# Patient Record
Sex: MALE | Race: WHITE | NOT HISPANIC OR LATINO | Employment: OTHER | URBAN - METROPOLITAN AREA
[De-identification: names, ages, dates, MRNs, and addresses within clinical notes are randomized per-mention and may not be internally consistent; named-entity substitution may affect disease eponyms.]

---

## 2017-03-14 ENCOUNTER — TELEPHONE (OUTPATIENT)
Dept: CARDIOLOGY | Facility: CLINIC | Age: 72
End: 2017-03-14

## 2017-03-14 DIAGNOSIS — I15.9 SECONDARY HYPERTENSION: ICD-10-CM

## 2017-03-14 DIAGNOSIS — I25.10 CORONARY ARTERY DISEASE, ANGINA PRESENCE UNSPECIFIED, UNSPECIFIED VESSEL OR LESION TYPE, UNSPECIFIED WHETHER NATIVE OR TRANSPLANTED HEART: ICD-10-CM

## 2017-03-14 DIAGNOSIS — Z95.1 S/P CABG X 2: Primary | ICD-10-CM

## 2017-03-14 DIAGNOSIS — I73.9 PVD (PERIPHERAL VASCULAR DISEASE): ICD-10-CM

## 2017-03-14 NOTE — TELEPHONE ENCOUNTER
----- Message from Rasheeda Hernandez sent at 3/14/2017 11:49 AM CDT -----  Contact: Stephanie  Patient's daughter called to schedule annual visit; scheduled for 5/19/17 and needs lab orders scheduled. Please call when scheduled [phone]. Thanks!

## 2017-05-12 ENCOUNTER — LAB VISIT (OUTPATIENT)
Dept: LAB | Facility: HOSPITAL | Age: 72
End: 2017-05-12
Attending: INTERNAL MEDICINE
Payer: MEDICARE

## 2017-05-12 DIAGNOSIS — I25.10 CORONARY ARTERY DISEASE, ANGINA PRESENCE UNSPECIFIED, UNSPECIFIED VESSEL OR LESION TYPE, UNSPECIFIED WHETHER NATIVE OR TRANSPLANTED HEART: ICD-10-CM

## 2017-05-12 DIAGNOSIS — I15.9 SECONDARY HYPERTENSION: ICD-10-CM

## 2017-05-12 DIAGNOSIS — Z95.1 S/P CABG X 2: ICD-10-CM

## 2017-05-12 DIAGNOSIS — I73.9 PVD (PERIPHERAL VASCULAR DISEASE): ICD-10-CM

## 2017-05-12 LAB
ALBUMIN SERPL BCP-MCNC: 3.7 G/DL
ALP SERPL-CCNC: 45 U/L
ALT SERPL W/O P-5'-P-CCNC: 11 U/L
ANION GAP SERPL CALC-SCNC: 7 MMOL/L
AST SERPL-CCNC: 15 U/L
BASOPHILS # BLD AUTO: 0.05 K/UL
BASOPHILS NFR BLD: 0.8 %
BILIRUB SERPL-MCNC: 0.5 MG/DL
BUN SERPL-MCNC: 28 MG/DL
CALCIUM SERPL-MCNC: 9.3 MG/DL
CHLORIDE SERPL-SCNC: 106 MMOL/L
CHOLEST/HDLC SERPL: 5.7 {RATIO}
CO2 SERPL-SCNC: 25 MMOL/L
CREAT SERPL-MCNC: 1.7 MG/DL
DIFFERENTIAL METHOD: ABNORMAL
EOSINOPHIL # BLD AUTO: 0.3 K/UL
EOSINOPHIL NFR BLD: 4.7 %
ERYTHROCYTE [DISTWIDTH] IN BLOOD BY AUTOMATED COUNT: 12.7 %
EST. GFR  (AFRICAN AMERICAN): 46.2 ML/MIN/1.73 M^2
EST. GFR  (NON AFRICAN AMERICAN): 40 ML/MIN/1.73 M^2
GLUCOSE SERPL-MCNC: 105 MG/DL
HCT VFR BLD AUTO: 42.1 %
HDL/CHOLESTEROL RATIO: 17.6 %
HDLC SERPL-MCNC: 182 MG/DL
HDLC SERPL-MCNC: 32 MG/DL
HGB BLD-MCNC: 14 G/DL
LDLC SERPL CALC-MCNC: 112.6 MG/DL
LYMPHOCYTES # BLD AUTO: 1.9 K/UL
LYMPHOCYTES NFR BLD: 28.2 %
MCH RBC QN AUTO: 30.9 PG
MCHC RBC AUTO-ENTMCNC: 33.3 %
MCV RBC AUTO: 93 FL
MONOCYTES # BLD AUTO: 0.4 K/UL
MONOCYTES NFR BLD: 6.5 %
NEUTROPHILS # BLD AUTO: 4 K/UL
NEUTROPHILS NFR BLD: 59.6 %
NONHDLC SERPL-MCNC: 150 MG/DL
PLATELET # BLD AUTO: 130 K/UL
PMV BLD AUTO: 10.9 FL
POTASSIUM SERPL-SCNC: 4.3 MMOL/L
PROT SERPL-MCNC: 7.1 G/DL
RBC # BLD AUTO: 4.53 M/UL
SODIUM SERPL-SCNC: 138 MMOL/L
TRIGL SERPL-MCNC: 187 MG/DL
WBC # BLD AUTO: 6.62 K/UL

## 2017-05-12 PROCEDURE — 80061 LIPID PANEL: CPT

## 2017-05-12 PROCEDURE — 36415 COLL VENOUS BLD VENIPUNCTURE: CPT | Mod: PO

## 2017-05-12 PROCEDURE — 85025 COMPLETE CBC W/AUTO DIFF WBC: CPT

## 2017-05-12 PROCEDURE — 80053 COMPREHEN METABOLIC PANEL: CPT

## 2017-05-19 ENCOUNTER — OFFICE VISIT (OUTPATIENT)
Dept: CARDIOLOGY | Facility: CLINIC | Age: 72
End: 2017-05-19
Payer: MEDICARE

## 2017-05-19 VITALS
DIASTOLIC BLOOD PRESSURE: 78 MMHG | BODY MASS INDEX: 25.58 KG/M2 | HEART RATE: 71 BPM | WEIGHT: 159.19 LBS | HEIGHT: 66 IN | SYSTOLIC BLOOD PRESSURE: 153 MMHG

## 2017-05-19 DIAGNOSIS — I10 ESSENTIAL HYPERTENSION: ICD-10-CM

## 2017-05-19 DIAGNOSIS — I73.9 PVD (PERIPHERAL VASCULAR DISEASE): Chronic | ICD-10-CM

## 2017-05-19 DIAGNOSIS — I25.83 CORONARY ARTERY DISEASE DUE TO LIPID RICH PLAQUE: Chronic | ICD-10-CM

## 2017-05-19 DIAGNOSIS — Z89.512 S/P BKA (BELOW KNEE AMPUTATION) BILATERAL: ICD-10-CM

## 2017-05-19 DIAGNOSIS — I35.0 NONRHEUMATIC AORTIC VALVE STENOSIS: ICD-10-CM

## 2017-05-19 DIAGNOSIS — Z72.0 TOBACCO ABUSE: Chronic | ICD-10-CM

## 2017-05-19 DIAGNOSIS — I25.10 CORONARY ARTERY DISEASE DUE TO LIPID RICH PLAQUE: Chronic | ICD-10-CM

## 2017-05-19 DIAGNOSIS — Z89.511 S/P BKA (BELOW KNEE AMPUTATION) BILATERAL: ICD-10-CM

## 2017-05-19 DIAGNOSIS — E78.2 MIXED HYPERLIPIDEMIA: Chronic | ICD-10-CM

## 2017-05-19 DIAGNOSIS — Z95.1 S/P CABG X 2: Primary | ICD-10-CM

## 2017-05-19 DIAGNOSIS — I25.2 HISTORY OF NON-ST ELEVATION MYOCARDIAL INFARCTION (NSTEMI): ICD-10-CM

## 2017-05-19 DIAGNOSIS — K92.2 LGI BLEED: ICD-10-CM

## 2017-05-19 DIAGNOSIS — I25.10 CORONARY ARTERY DISEASE INVOLVING NATIVE CORONARY ARTERY WITHOUT ANGINA PECTORIS, UNSPECIFIED WHETHER NATIVE OR TRANSPLANTED HEART: ICD-10-CM

## 2017-05-19 DIAGNOSIS — I70.1 RAS (RENAL ARTERY STENOSIS): ICD-10-CM

## 2017-05-19 PROCEDURE — 99999 PR PBB SHADOW E&M-EST. PATIENT-LVL III: CPT | Mod: PBBFAC,,, | Performed by: INTERNAL MEDICINE

## 2017-05-19 PROCEDURE — 99213 OFFICE O/P EST LOW 20 MIN: CPT | Mod: PBBFAC,PO | Performed by: INTERNAL MEDICINE

## 2017-05-19 PROCEDURE — 99214 OFFICE O/P EST MOD 30 MIN: CPT | Mod: S$PBB,,, | Performed by: INTERNAL MEDICINE

## 2017-05-19 RX ORDER — CLOPIDOGREL BISULFATE 75 MG/1
75 TABLET ORAL DAILY
Qty: 90 TABLET | Refills: 6 | Status: SHIPPED | OUTPATIENT
Start: 2017-05-19 | End: 2018-05-29 | Stop reason: SDUPTHER

## 2017-05-19 RX ORDER — CARVEDILOL 25 MG/1
25 TABLET ORAL 2 TIMES DAILY
Qty: 180 TABLET | Refills: 5 | Status: SHIPPED | OUTPATIENT
Start: 2017-05-19 | End: 2018-05-29 | Stop reason: SDUPTHER

## 2017-05-19 RX ORDER — AMLODIPINE BESYLATE 5 MG/1
5 TABLET ORAL DAILY
Qty: 90 TABLET | Refills: 6 | Status: SHIPPED | OUTPATIENT
Start: 2017-05-19 | End: 2018-05-29 | Stop reason: SDUPTHER

## 2017-05-19 RX ORDER — LOSARTAN POTASSIUM AND HYDROCHLOROTHIAZIDE 25; 100 MG/1; MG/1
1 TABLET ORAL DAILY
Qty: 90 TABLET | Refills: 5 | Status: SHIPPED | OUTPATIENT
Start: 2017-05-19 | End: 2018-05-29

## 2017-05-19 RX ORDER — PRAVASTATIN SODIUM 40 MG/1
40 TABLET ORAL DAILY
Qty: 90 TABLET | Refills: 5 | Status: SHIPPED | OUTPATIENT
Start: 2017-05-19 | End: 2018-05-29

## 2017-05-19 RX ORDER — NITROGLYCERIN 0.4 MG/1
0.4 TABLET SUBLINGUAL EVERY 5 MIN PRN
Qty: 25 TABLET | Refills: 6 | Status: SHIPPED | OUTPATIENT
Start: 2017-05-19

## 2017-05-19 RX ORDER — CLONIDINE HYDROCHLORIDE 0.1 MG/1
0.1 TABLET ORAL EVERY 6 HOURS PRN
Qty: 60 TABLET | Refills: 12 | Status: SHIPPED | OUTPATIENT
Start: 2017-05-19 | End: 2018-05-29 | Stop reason: SDUPTHER

## 2017-05-19 RX ORDER — PRAVASTATIN SODIUM 40 MG/1
40 TABLET ORAL DAILY
COMMUNITY
End: 2017-05-19 | Stop reason: SDUPTHER
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
## 2017-05-19 NOTE — MR AVS SNAPSHOT
[facility]  [address] [facility] [address]  [facility] [address]  Phone: [phone]                  Evaristo Cardoso   2017 10:20 AM   Office Visit    Description:  Male : 1945   Provider:  Vernon Kelley MD   Department:  [facility]           Reason for Visit     Hyperlipidemia     Hypertension     Peripheral Vascular Disease     Coronary Artery Disease           Diagnoses this Visit        Comments    S/P CABG x 2    -  Primary     LUIS ALFREDO (renal artery stenosis)         PVD (peripheral vascular disease)         History of non-ST elevation myocardial infarction (NSTEMI)         Coronary artery disease due to lipid rich plaque         Nonrheumatic aortic valve stenosis         Mixed hyperlipidemia         S/P BKA (below knee amputation) bilateral         Coronary artery disease involving native coronary artery without angina pectoris, unspecified whether native or transplanted heart         Essential hypertension         Tobacco abuse         LGI bleed                To Do List           Goals (5 Years of Data)     None      Follow-Up and Disposition     Return in about 9 months (around 2018).       These Medications        Disp Refills Start End    pravastatin (PRAVACHOL) 40 MG tablet 90 tablet 5 2017     Take 1 tablet (40 mg total) by mouth once daily. - Oral    Ph[facility]acy: [id number] [place] [address] Ph #: [phone]       nitroGLYCERIN (NITROSTAT) 0.4 MG SL tablet 25 tablet 6 2017     Place 1 tablet (0.4 mg total) under the tongue every 5 (five) minutes as needed. - Sublingual    Ph[facility]: [id number] Ph #: [phone]       losartan-hydrochlorothiazide 100-25 mg (HYZAAR) 100-25 mg per tablet 90 tablet 5 2017     Take 1 tablet by mouth once daily. - Oral    Ph[facility]acy: [id number] Ph #: [phone]       clopidogrel (PLAVIX) 75 mg tablet 90  tablet 6 5/19/2017     Take 1 tablet (75 mg total) by mouth once daily. - Oral    Ph[facility]acy: [facility] [address] #: [phone]       cloNIDine (CATAPRES) 0.1 MG tablet 60 tablet 12 5/19/2017     Take 1 tablet (0.1 mg total) by mouth every 6 (six) hours as needed. Prn sbp > 160 - Oral    Ph[facility]acy: [facility] [address] #: [phone]       carvedilol (COREG) 25 MG tablet 180 tablet 5 5/19/2017     Take 1 tablet (25 mg total) by mouth 2 (two) times daily. - Oral    Ph[facility]acy: [facility] [address] #: [phone]       amlodipine (NORVASC) 5 MG tablet 90 tablet 6 5/19/2017 5/19/2018    Take 1 tablet (5 mg total) by mouth once daily. - Oral    Ph[facility]acy: [facility] [address] #: [phone]         [facility]     [facility] Nurse Care Line - 24/7 Assistance  Unless otherwise directed by your provider, please contact [facility], our nurse care line that is available for 24/7 assistance.     Registered nurses in the [facility] provide: appointment scheduling, clinical advisement, health education, and other advisory services.  Call: [phone] (toll free)               Medications           Message regarding Medications     Verify the changes and/or additions to your medication regime listed below are the same as discussed with your clinician today.  If any of these changes or additions are incorrect, please notify your healthcare provider.        START taking these NEW medications        Refills    pravastatin (PRAVACHOL) 40 MG tablet 5    Sig: Take 1 tablet (40 mg total) by mouth once daily.    Class: Normal    Route: Oral      CHANGE how you are taking these medications     Start Taking Instead of    carvedilol (COREG) 25 MG tablet carvedilol (COREG) 25 MG tablet    Dosage:  Take 1 tablet (25 mg total) by mouth 2 (two) times daily. Dosage:   "TAKE ONE TABLET BY MOUTH TWICE DAILY.    Reason for Change:  Reorder            Verify that the below list of medications is an accurate representation of the medications you are currently taking.  If none reported, the list may be blank. If incorrect, please contact your healthcare provider. Carry this list with you in case of emergency.           Current Medications     amlodipine (NORVASC) 5 MG tablet Take 1 tablet (5 mg total) by mouth once daily.    aspirin (ECOTRIN) 81 MG EC tablet Take 81 mg by mouth once daily.      carvedilol (COREG) 25 MG tablet Take 1 tablet (25 mg total) by mouth 2 (two) times daily.    cloNIDine (CATAPRES) 0.1 MG tablet Take 1 tablet (0.1 mg total) by mouth every 6 (six) hours as needed. Prn sbp > 160    clopidogrel (PLAVIX) 75 mg tablet Take 1 tablet (75 mg total) by mouth once daily.    escitalopram (LEXAPRO) 10 MG tablet Take 1 tablet (10 mg total) by mouth once daily.    lorazepam (ATIVAN) 0.5 MG tablet Take 1 tablet (0.5 mg total) by mouth every evening.    losartan-hydrochlorothiazide 100-25 mg (HYZAAR) 100-25 mg per tablet Take 1 tablet by mouth once daily.    nitroGLYCERIN (NITROSTAT) 0.4 MG SL tablet Place 1 tablet (0.4 mg total) under the tongue every 5 (five) minutes as needed.    pravastatin (PRAVACHOL) 40 MG tablet Take 1 tablet (40 mg total) by mouth once daily.    zolpidem (AMBIEN) 10 mg Tab Take 5 mg by mouth nightly as needed.           Clinical Reference Information           Your Vitals Were     BP Pulse Height Weight BMI    153/78 (BP Location: Right arm, Patient Position: Sitting, BP Method: Automatic) 71 5' 6" (1.676 m) 72.2 kg (159 lb 2.8 oz) 25.69 kg/m2      Blood Pressure          Most Recent Value    BP  (!)  153/78      Allergies as of 5/19/2017     Crestor [Rosuvastatin]    Lipitor [Atorvastatin]    Liptruzet [Ezetimibe-atorvastatin]    Lisinopril      Immunizations Administered on Date of Encounter - 5/19/2017     None      Orders Placed During Today's Visit "     Future Labs/Procedures Expected by Expires    2D echo with color flow doppler  As directed 5/20/2018    CAR Ultrasound doppler carotid bliateral  As directed 5/19/2018      [facility] Sign-Up     Activating your [facility] account is as easy as 1-2-3!     1) Visit [email or web address], select Sign Up Now, enter this activation code and your date of birth, then select Next.  [email or web address]  Expires: 7/3/2017 10:49 AM      2) Create a username and password to use when you visit [facility] in the future and select a security question in case you lose your password and select Next.    3) Enter your e-mail address and click Sign Up!    Additional Information  If you have questions, please e-mail [email or web address] or call [phone] to talk to our [facility] staff. Remember, [facility] is NOT to be used for urgent needs. For medical emergencies, dial 911.         Smoking Cessation     If you would like to quit smoking:   You may be eligible for free services if you are a [place] resident and started smoking cigarettes before September 1, 1988.  Call the Smoking Cessation [employer] ([facility]) toll free at [phone] or [phone].   Call 7-601-QUIT-NOW if you do not meet the above criteria.   Contact us via email: [email or web address]   View our website for more information: [email or web address]        Language Assistance Services     ATTENTION: Language assistance services are available, free of charge. Please call [phone].      ATENCIÓN: Si habla español, tiene a naidu disposición servicios gratuitos de asistencia lingüística. Llame al [phone].     CHÚ Ý: N?u b?n nói Ti?ng Vi?t, có các d?ch v? h? tr? ngôn ng? mi?n phí dành cho b?n. G?i s? [phone].         [facility] complies with applicable Federal civil rights laws and does not discriminate on the basis of race, color, national origin, age, disability, or sex.

## 2017-06-01 ENCOUNTER — CLINICAL SUPPORT (OUTPATIENT)
Dept: CARDIOLOGY | Facility: CLINIC | Age: 72
End: 2017-06-01
Payer: MEDICARE

## 2017-06-01 DIAGNOSIS — Z89.512 S/P BKA (BELOW KNEE AMPUTATION) BILATERAL: ICD-10-CM

## 2017-06-01 DIAGNOSIS — I70.1 RAS (RENAL ARTERY STENOSIS): ICD-10-CM

## 2017-06-01 DIAGNOSIS — Z72.0 TOBACCO ABUSE: Chronic | ICD-10-CM

## 2017-06-01 DIAGNOSIS — I25.10 CORONARY ARTERY DISEASE DUE TO LIPID RICH PLAQUE: Chronic | ICD-10-CM

## 2017-06-01 DIAGNOSIS — Z89.511 S/P BKA (BELOW KNEE AMPUTATION) BILATERAL: ICD-10-CM

## 2017-06-01 DIAGNOSIS — E78.2 MIXED HYPERLIPIDEMIA: Chronic | ICD-10-CM

## 2017-06-01 DIAGNOSIS — I73.9 PVD (PERIPHERAL VASCULAR DISEASE): Chronic | ICD-10-CM

## 2017-06-01 DIAGNOSIS — I25.2 HISTORY OF NON-ST ELEVATION MYOCARDIAL INFARCTION (NSTEMI): ICD-10-CM

## 2017-06-01 DIAGNOSIS — I25.10 CORONARY ARTERY DISEASE INVOLVING NATIVE CORONARY ARTERY WITHOUT ANGINA PECTORIS, UNSPECIFIED WHETHER NATIVE OR TRANSPLANTED HEART: ICD-10-CM

## 2017-06-01 DIAGNOSIS — I10 ESSENTIAL HYPERTENSION: ICD-10-CM

## 2017-06-01 DIAGNOSIS — I25.83 CORONARY ARTERY DISEASE DUE TO LIPID RICH PLAQUE: Chronic | ICD-10-CM

## 2017-06-01 DIAGNOSIS — I35.0 NONRHEUMATIC AORTIC VALVE STENOSIS: ICD-10-CM

## 2017-06-01 DIAGNOSIS — Z95.1 S/P CABG X 2: ICD-10-CM

## 2017-06-01 DIAGNOSIS — K92.2 LGI BLEED: ICD-10-CM

## 2017-06-01 PROCEDURE — 93306 TTE W/DOPPLER COMPLETE: CPT | Mod: 26,S$PBB,, | Performed by: INTERNAL MEDICINE

## 2017-06-01 PROCEDURE — 93880 EXTRACRANIAL BILAT STUDY: CPT | Mod: PBBFAC,PO | Performed by: INTERNAL MEDICINE

## 2017-06-02 ENCOUNTER — TELEPHONE (OUTPATIENT)
Dept: CARDIOLOGY | Facility: CLINIC | Age: 72
End: 2017-06-02

## 2017-06-02 LAB
AORTIC VALVE REGURGITATION: ABNORMAL
AORTIC VALVE STENOSIS: ABNORMAL
DIASTOLIC DYSFUNCTION: YES
ESTIMATED PA SYSTOLIC PRESSURE: 21.49
INTERNAL CAROTID STENOSIS: NORMAL
MITRAL VALVE MOBILITY: NORMAL
MITRAL VALVE REGURGITATION: ABNORMAL
RETIRED EF AND QEF - SEE NOTES: 55 (ref 55–65)
TRICUSPID VALVE REGURGITATION: ABNORMAL

## 2017-06-02 NOTE — TELEPHONE ENCOUNTER
Carotid u/s results mailed today.  Attempted to call pt to inform test was WNL for him, no answer, LVM.

## 2018-05-29 ENCOUNTER — OFFICE VISIT (OUTPATIENT)
Dept: CARDIOLOGY | Facility: CLINIC | Age: 73
End: 2018-05-29
Payer: MEDICARE

## 2018-05-29 VITALS
HEART RATE: 82 BPM | HEIGHT: 66 IN | WEIGHT: 154.31 LBS | BODY MASS INDEX: 24.8 KG/M2 | DIASTOLIC BLOOD PRESSURE: 96 MMHG | SYSTOLIC BLOOD PRESSURE: 194 MMHG

## 2018-05-29 DIAGNOSIS — Z89.512 S/P BKA (BELOW KNEE AMPUTATION) BILATERAL: ICD-10-CM

## 2018-05-29 DIAGNOSIS — I25.10 CORONARY ARTERY DISEASE INVOLVING NATIVE CORONARY ARTERY WITHOUT ANGINA PECTORIS, UNSPECIFIED WHETHER NATIVE OR TRANSPLANTED HEART: ICD-10-CM

## 2018-05-29 DIAGNOSIS — K92.2 LGI BLEED: ICD-10-CM

## 2018-05-29 DIAGNOSIS — I73.9 PVD (PERIPHERAL VASCULAR DISEASE): Chronic | ICD-10-CM

## 2018-05-29 DIAGNOSIS — I25.83 CORONARY ARTERY DISEASE DUE TO LIPID RICH PLAQUE: Chronic | ICD-10-CM

## 2018-05-29 DIAGNOSIS — E78.2 MIXED HYPERLIPIDEMIA: Chronic | ICD-10-CM

## 2018-05-29 DIAGNOSIS — I35.0 NONRHEUMATIC AORTIC VALVE STENOSIS: ICD-10-CM

## 2018-05-29 DIAGNOSIS — Z89.511 S/P BKA (BELOW KNEE AMPUTATION) BILATERAL: ICD-10-CM

## 2018-05-29 DIAGNOSIS — I70.1 RAS (RENAL ARTERY STENOSIS): ICD-10-CM

## 2018-05-29 DIAGNOSIS — Z95.1 S/P CABG X 2: ICD-10-CM

## 2018-05-29 DIAGNOSIS — Z72.0 TOBACCO ABUSE: Chronic | ICD-10-CM

## 2018-05-29 DIAGNOSIS — I10 ESSENTIAL HYPERTENSION: ICD-10-CM

## 2018-05-29 DIAGNOSIS — I25.10 CORONARY ARTERY DISEASE INVOLVING NATIVE CORONARY ARTERY OF NATIVE HEART WITHOUT ANGINA PECTORIS: Primary | Chronic | ICD-10-CM

## 2018-05-29 DIAGNOSIS — I25.10 CORONARY ARTERY DISEASE DUE TO LIPID RICH PLAQUE: Chronic | ICD-10-CM

## 2018-05-29 DIAGNOSIS — I25.2 HISTORY OF NON-ST ELEVATION MYOCARDIAL INFARCTION (NSTEMI): ICD-10-CM

## 2018-05-29 PROCEDURE — 99999 PR PBB SHADOW E&M-EST. PATIENT-LVL III: CPT | Mod: PBBFAC,,, | Performed by: INTERNAL MEDICINE

## 2018-05-29 PROCEDURE — 99214 OFFICE O/P EST MOD 30 MIN: CPT | Mod: S$PBB,,, | Performed by: INTERNAL MEDICINE

## 2018-05-29 PROCEDURE — 99213 OFFICE O/P EST LOW 20 MIN: CPT | Mod: PBBFAC,PO | Performed by: INTERNAL MEDICINE

## 2018-05-29 RX ORDER — CLOPIDOGREL BISULFATE 75 MG/1
75 TABLET ORAL DAILY
Qty: 90 TABLET | Refills: 6 | Status: SHIPPED | OUTPATIENT
Start: 2018-05-29 | End: 2019-08-27 | Stop reason: SDUPTHER

## 2018-05-29 RX ORDER — CARVEDILOL 25 MG/1
25 TABLET ORAL 2 TIMES DAILY
Qty: 180 TABLET | Refills: 5 | Status: SHIPPED | OUTPATIENT
Start: 2018-05-29 | End: 2018-08-28 | Stop reason: SDUPTHER

## 2018-05-29 RX ORDER — CLONIDINE HYDROCHLORIDE 0.1 MG/1
0.1 TABLET ORAL EVERY 6 HOURS PRN
Qty: 60 TABLET | Refills: 12 | Status: SHIPPED | OUTPATIENT
Start: 2018-05-29 | End: 2021-02-03

## 2018-05-29 RX ORDER — VALSARTAN AND HYDROCHLOROTHIAZIDE 320; 25 MG/1; MG/1
1 TABLET, FILM COATED ORAL EVERY MORNING
Qty: 90 TABLET | Refills: 4 | Status: SHIPPED | OUTPATIENT
Start: 2018-05-29 | End: 2018-08-28

## 2018-05-29 RX ORDER — ROSUVASTATIN CALCIUM 10 MG/1
10 TABLET, COATED ORAL NIGHTLY
Qty: 90 TABLET | Refills: 4 | Status: SHIPPED | OUTPATIENT
Start: 2018-05-29 | End: 2018-08-28

## 2018-05-29 RX ORDER — AMLODIPINE BESYLATE 5 MG/1
5 TABLET ORAL NIGHTLY
Qty: 90 TABLET | Refills: 6 | Status: SHIPPED | OUTPATIENT
Start: 2018-05-29 | End: 2018-08-28 | Stop reason: SDUPTHER
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
## 2018-07-30 ENCOUNTER — CLINICAL SUPPORT (OUTPATIENT)
Dept: CARDIOLOGY | Facility: CLINIC | Age: 73
End: 2018-07-30
Attending: INTERNAL MEDICINE
Payer: MEDICARE

## 2018-07-30 ENCOUNTER — LAB VISIT (OUTPATIENT)
Dept: LAB | Facility: HOSPITAL | Age: 73
End: 2018-07-30
Attending: INTERNAL MEDICINE
Payer: MEDICARE

## 2018-07-30 VITALS
HEIGHT: 66 IN | WEIGHT: 154 LBS | SYSTOLIC BLOOD PRESSURE: 140 MMHG | BODY MASS INDEX: 24.75 KG/M2 | DIASTOLIC BLOOD PRESSURE: 70 MMHG | HEART RATE: 69 BPM

## 2018-07-30 DIAGNOSIS — I70.1 RAS (RENAL ARTERY STENOSIS): ICD-10-CM

## 2018-07-30 DIAGNOSIS — I25.10 CORONARY ARTERY DISEASE INVOLVING NATIVE CORONARY ARTERY OF NATIVE HEART WITHOUT ANGINA PECTORIS: Chronic | ICD-10-CM

## 2018-07-30 DIAGNOSIS — Z89.512 S/P BKA (BELOW KNEE AMPUTATION) BILATERAL: ICD-10-CM

## 2018-07-30 DIAGNOSIS — Z89.511 S/P BKA (BELOW KNEE AMPUTATION) BILATERAL: ICD-10-CM

## 2018-07-30 DIAGNOSIS — I73.9 PVD (PERIPHERAL VASCULAR DISEASE): Chronic | ICD-10-CM

## 2018-07-30 DIAGNOSIS — Z95.1 S/P CABG X 2: ICD-10-CM

## 2018-07-30 DIAGNOSIS — I35.0 NONRHEUMATIC AORTIC VALVE STENOSIS: ICD-10-CM

## 2018-07-30 DIAGNOSIS — I25.2 HISTORY OF NON-ST ELEVATION MYOCARDIAL INFARCTION (NSTEMI): ICD-10-CM

## 2018-07-30 LAB
ALBUMIN SERPL BCP-MCNC: 3.9 G/DL
ALP SERPL-CCNC: 49 U/L
ALT SERPL W/O P-5'-P-CCNC: 9 U/L
ANION GAP SERPL CALC-SCNC: 8 MMOL/L
AST SERPL-CCNC: 14 U/L
BASOPHILS # BLD AUTO: 0.06 K/UL
BASOPHILS NFR BLD: 0.8 %
BILIRUB SERPL-MCNC: 0.5 MG/DL
BUN SERPL-MCNC: 47 MG/DL
CALCIUM SERPL-MCNC: 9.8 MG/DL
CHLORIDE SERPL-SCNC: 103 MMOL/L
CHOLEST SERPL-MCNC: 194 MG/DL
CHOLEST/HDLC SERPL: 7.2 {RATIO}
CK SERPL-CCNC: 76 U/L
CO2 SERPL-SCNC: 24 MMOL/L
CREAT SERPL-MCNC: 2 MG/DL
DIFFERENTIAL METHOD: ABNORMAL
EOSINOPHIL # BLD AUTO: 0.5 K/UL
EOSINOPHIL NFR BLD: 6.6 %
ERYTHROCYTE [DISTWIDTH] IN BLOOD BY AUTOMATED COUNT: 12.3 %
EST. GFR  (AFRICAN AMERICAN): 37.7 ML/MIN/1.73 M^2
EST. GFR  (NON AFRICAN AMERICAN): 32.6 ML/MIN/1.73 M^2
GLUCOSE SERPL-MCNC: 111 MG/DL
HCT VFR BLD AUTO: 42 %
HDLC SERPL-MCNC: 27 MG/DL
HDLC SERPL: 13.9 %
HGB BLD-MCNC: 13.7 G/DL
IMM GRANULOCYTES # BLD AUTO: 0.02 K/UL
IMM GRANULOCYTES NFR BLD AUTO: 0.3 %
LDLC SERPL CALC-MCNC: 133.6 MG/DL
LYMPHOCYTES # BLD AUTO: 2 K/UL
LYMPHOCYTES NFR BLD: 27.6 %
MCH RBC QN AUTO: 30.5 PG
MCHC RBC AUTO-ENTMCNC: 32.6 G/DL
MCV RBC AUTO: 94 FL
MONOCYTES # BLD AUTO: 0.5 K/UL
MONOCYTES NFR BLD: 6.6 %
NEUTROPHILS # BLD AUTO: 4.1 K/UL
NEUTROPHILS NFR BLD: 58.1 %
NONHDLC SERPL-MCNC: 167 MG/DL
NRBC BLD-RTO: 0 /100 WBC
PLATELET # BLD AUTO: 166 K/UL
PMV BLD AUTO: 10.9 FL
POTASSIUM SERPL-SCNC: 5 MMOL/L
PROT SERPL-MCNC: 7.5 G/DL
RBC # BLD AUTO: 4.49 M/UL
SODIUM SERPL-SCNC: 135 MMOL/L
TRIGL SERPL-MCNC: 167 MG/DL
WBC # BLD AUTO: 7.13 K/UL

## 2018-07-30 PROCEDURE — 99999 PR PBB SHADOW E&M-EST. PATIENT-LVL II: CPT | Mod: PBBFAC,,,

## 2018-07-30 PROCEDURE — 82550 ASSAY OF CK (CPK): CPT

## 2018-07-30 PROCEDURE — 85025 COMPLETE CBC W/AUTO DIFF WBC: CPT

## 2018-07-30 PROCEDURE — 99212 OFFICE O/P EST SF 10 MIN: CPT | Mod: PBBFAC,PO

## 2018-07-30 PROCEDURE — 80061 LIPID PANEL: CPT

## 2018-07-30 PROCEDURE — 80053 COMPREHEN METABOLIC PANEL: CPT

## 2018-07-30 PROCEDURE — 93306 TTE W/DOPPLER COMPLETE: CPT | Mod: PBBFAC,PO | Performed by: INTERNAL MEDICINE

## 2018-07-30 PROCEDURE — 36415 COLL VENOUS BLD VENIPUNCTURE: CPT | Mod: PO

## 2018-07-31 LAB
ASCENDING AORTA: 3.85 CM
AV MEAN GRADIENT: 22.17 MMHG
AV PEAK GRADIENT: 44.64 MMHG
AV VALVE AREA: 1.35 CM2
BSA FOR ECHO PROCEDURE: 1.8 M2
CV ECHO LV RWT: 0.64 CM
DOP CALC AO PEAK VEL: 3.34 M/S
DOP CALC AO VTI: 89.18 CM
DOP CALC LVOT AREA: 4.3 CM2
DOP CALC LVOT DIAMETER: 2.34 CM
DOP CALC LVOT STROKE VOLUME: 120.14 CM3
DOP CALCLVOT PEAK VEL VTI: 27.95 CM
E WAVE DECELERATION TIME: 383.62 MSEC
E/A RATIO: 0.74
E/E' RATIO: 16.2
ECHO LV POSTERIOR WALL: 0.99 CM (ref 0.6–1.1)
FRACTIONAL SHORTENING: 34 % (ref 28–44)
INTERVENTRICULAR SEPTUM: 1.5 CM (ref 0.6–1.1)
IVRT: 0.15 MSEC
LA MAJOR: 5.18 CM
LA MINOR: 5.07 CM
LA WIDTH: 3.65 CM
LEFT ATRIUM SIZE: 3.29 CM
LEFT ATRIUM VOLUME INDEX: 29.1 ML/M2
LEFT ATRIUM VOLUME: 52.31 CM3
LEFT INTERNAL DIMENSION IN SYSTOLE: 2.59 CM (ref 2.1–4)
LEFT VENTRICLE MASS INDEX: 93.9 G/M2
LEFT VENTRICULAR INTERNAL DIMENSION IN DIASTOLE: 3.91 CM (ref 3.5–6)
LEFT VENTRICULAR MASS: 168.97 G
LV LATERAL E/E' RATIO: 13.5
LV SEPTAL E/E' RATIO: 20.25
MV PEAK A VEL: 1.09 M/S
MV PEAK E VEL: 0.81 M/S
MV STENOSIS PRESSURE HALF TIME: 111.25 MS
MV VALVE AREA P 1/2 METHOD: 1.98 CM2
PISA TR MAX VEL: 2.29 M/S
PULM VEIN S/D RATIO: 2.07
PV PEAK D VEL: 0.29 M/S
PV PEAK S VEL: 0.6 M/S
RA MAJOR: 4.09 CM
RA PRESSURE: 3 MMHG
RA WIDTH: 3.8 CM
RIGHT VENTRICULAR END-DIASTOLIC DIMENSION: 4.55 CM
SINUS: 4.26 CM
STJ: 3.6 CM
TDI LATERAL: 0.06
TDI SEPTAL: 0.04
TDI: 0.05
TR MAX PG: 20.98 MMHG
TRICUSPID ANNULAR PLANE SYSTOLIC EXCURSION: 0.02 CM
TV REST PULMONARY ARTERY PRESSURE: 23.98 MMHG

## 2018-08-28 ENCOUNTER — OFFICE VISIT (OUTPATIENT)
Dept: CARDIOLOGY | Facility: CLINIC | Age: 73
End: 2018-08-28
Payer: MEDICARE

## 2018-08-28 VITALS
HEIGHT: 66 IN | BODY MASS INDEX: 24.7 KG/M2 | DIASTOLIC BLOOD PRESSURE: 78 MMHG | HEART RATE: 63 BPM | WEIGHT: 153.69 LBS | SYSTOLIC BLOOD PRESSURE: 167 MMHG

## 2018-08-28 DIAGNOSIS — Z72.0 TOBACCO ABUSE: Chronic | ICD-10-CM

## 2018-08-28 DIAGNOSIS — I25.10 CORONARY ARTERY DISEASE INVOLVING NATIVE CORONARY ARTERY OF NATIVE HEART WITHOUT ANGINA PECTORIS: Chronic | ICD-10-CM

## 2018-08-28 DIAGNOSIS — Z78.9 STATIN INTOLERANCE: ICD-10-CM

## 2018-08-28 DIAGNOSIS — E78.2 MIXED HYPERLIPIDEMIA: Chronic | ICD-10-CM

## 2018-08-28 DIAGNOSIS — I73.9 PVD (PERIPHERAL VASCULAR DISEASE): Chronic | ICD-10-CM

## 2018-08-28 DIAGNOSIS — I10 ESSENTIAL HYPERTENSION: Chronic | ICD-10-CM

## 2018-08-28 DIAGNOSIS — I70.1 RAS (RENAL ARTERY STENOSIS): ICD-10-CM

## 2018-08-28 DIAGNOSIS — K92.2 LGI BLEED: ICD-10-CM

## 2018-08-28 DIAGNOSIS — I25.10 CORONARY ARTERY DISEASE INVOLVING NATIVE CORONARY ARTERY WITHOUT ANGINA PECTORIS, UNSPECIFIED WHETHER NATIVE OR TRANSPLANTED HEART: ICD-10-CM

## 2018-08-28 DIAGNOSIS — I35.0 NONRHEUMATIC AORTIC VALVE STENOSIS: ICD-10-CM

## 2018-08-28 DIAGNOSIS — Z95.1 S/P CABG X 2: Primary | ICD-10-CM

## 2018-08-28 PROCEDURE — 99999 PR PBB SHADOW E&M-EST. PATIENT-LVL III: CPT | Mod: PBBFAC,,, | Performed by: INTERNAL MEDICINE

## 2018-08-28 PROCEDURE — 99214 OFFICE O/P EST MOD 30 MIN: CPT | Mod: S$PBB,,, | Performed by: INTERNAL MEDICINE

## 2018-08-28 PROCEDURE — 99213 OFFICE O/P EST LOW 20 MIN: CPT | Mod: PBBFAC,PO | Performed by: INTERNAL MEDICINE

## 2018-08-28 RX ORDER — CARVEDILOL 25 MG/1
25 TABLET ORAL 2 TIMES DAILY
Qty: 180 TABLET | Refills: 5 | Status: SHIPPED | OUTPATIENT
Start: 2018-08-28 | End: 2019-08-27 | Stop reason: SDUPTHER

## 2018-08-28 RX ORDER — LOSARTAN POTASSIUM AND HYDROCHLOROTHIAZIDE 25; 100 MG/1; MG/1
1 TABLET ORAL DAILY
Qty: 90 TABLET | Refills: 3 | Status: SHIPPED | OUTPATIENT
Start: 2018-08-28 | End: 2019-08-27 | Stop reason: SDUPTHER

## 2018-08-28 RX ORDER — AMLODIPINE BESYLATE 5 MG/1
5 TABLET ORAL NIGHTLY
Qty: 90 TABLET | Refills: 6 | Status: SHIPPED | OUTPATIENT
Start: 2018-08-28 | End: 2019-08-27 | Stop reason: SDUPTHER
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
## 2019-08-23 ENCOUNTER — CLINICAL SUPPORT (OUTPATIENT)
Dept: CARDIOLOGY | Facility: CLINIC | Age: 74
End: 2019-08-23
Attending: INTERNAL MEDICINE
Payer: MEDICARE

## 2019-08-23 ENCOUNTER — LAB VISIT (OUTPATIENT)
Dept: LAB | Facility: HOSPITAL | Age: 74
End: 2019-08-23
Attending: INTERNAL MEDICINE
Payer: MEDICARE

## 2019-08-23 VITALS
HEART RATE: 63 BPM | SYSTOLIC BLOOD PRESSURE: 160 MMHG | HEIGHT: 66 IN | WEIGHT: 153 LBS | DIASTOLIC BLOOD PRESSURE: 70 MMHG | BODY MASS INDEX: 24.59 KG/M2

## 2019-08-23 DIAGNOSIS — E78.2 MIXED HYPERLIPIDEMIA: ICD-10-CM

## 2019-08-23 DIAGNOSIS — I73.9 PVD (PERIPHERAL VASCULAR DISEASE): Chronic | ICD-10-CM

## 2019-08-23 DIAGNOSIS — E78.2 MIXED HYPERLIPIDEMIA: Chronic | ICD-10-CM

## 2019-08-23 DIAGNOSIS — I70.1 RAS (RENAL ARTERY STENOSIS): ICD-10-CM

## 2019-08-23 DIAGNOSIS — Z95.1 S/P CABG X 2: ICD-10-CM

## 2019-08-23 DIAGNOSIS — I73.9 PVD (PERIPHERAL VASCULAR DISEASE): ICD-10-CM

## 2019-08-23 LAB
ALBUMIN SERPL BCP-MCNC: 3.7 G/DL (ref 3.5–5.2)
ALP SERPL-CCNC: 54 U/L (ref 55–135)
ALT SERPL W/O P-5'-P-CCNC: 8 U/L (ref 10–44)
ANION GAP SERPL CALC-SCNC: 8 MMOL/L (ref 8–16)
ASCENDING AORTA: 4.47 CM
AST SERPL-CCNC: 11 U/L (ref 10–40)
AV INDEX (PROSTH): 0.31
AV MEAN GRADIENT: 27 MMHG
AV PEAK GRADIENT: 45 MMHG
AV VALVE AREA: 1.28 CM2
AV VELOCITY RATIO: 0.26
BASOPHILS # BLD AUTO: 0.07 K/UL (ref 0–0.2)
BASOPHILS NFR BLD: 1.2 % (ref 0–1.9)
BILIRUB SERPL-MCNC: 0.6 MG/DL (ref 0.1–1)
BSA FOR ECHO PROCEDURE: 1.8 M2
BUN SERPL-MCNC: 26 MG/DL (ref 8–23)
CALCIUM SERPL-MCNC: 9.4 MG/DL (ref 8.7–10.5)
CHLORIDE SERPL-SCNC: 104 MMOL/L (ref 95–110)
CHOLEST SERPL-MCNC: 220 MG/DL (ref 120–199)
CHOLEST/HDLC SERPL: 8.5 {RATIO} (ref 2–5)
CO2 SERPL-SCNC: 25 MMOL/L (ref 23–29)
CREAT SERPL-MCNC: 2 MG/DL (ref 0.5–1.4)
CV ECHO LV RWT: 0.71 CM
DIFFERENTIAL METHOD: ABNORMAL
DOP CALC AO PEAK VEL: 3.36 M/S
DOP CALC AO VTI: 70.3 CM
DOP CALC LVOT AREA: 4.1 CM2
DOP CALC LVOT DIAMETER: 2.29 CM
DOP CALC LVOT PEAK VEL: 0.87 M/S
DOP CALC LVOT STROKE VOLUME: 89.7 CM3
DOP CALCLVOT PEAK VEL VTI: 21.79 CM
E WAVE DECELERATION TIME: 217.36 MSEC
E/A RATIO: 0.63
E/E' RATIO: 12.17 M/S
ECHO LV POSTERIOR WALL: 1.26 CM (ref 0.6–1.1)
EOSINOPHIL # BLD AUTO: 0.5 K/UL (ref 0–0.5)
EOSINOPHIL NFR BLD: 8.9 % (ref 0–8)
ERYTHROCYTE [DISTWIDTH] IN BLOOD BY AUTOMATED COUNT: 12.8 % (ref 11.5–14.5)
EST. GFR  (AFRICAN AMERICAN): 37.4 ML/MIN/1.73 M^2
EST. GFR  (NON AFRICAN AMERICAN): 32.4 ML/MIN/1.73 M^2
FRACTIONAL SHORTENING: 29 % (ref 28–44)
GLUCOSE SERPL-MCNC: 108 MG/DL (ref 70–110)
HCT VFR BLD AUTO: 39.7 % (ref 40–54)
HDLC SERPL-MCNC: 26 MG/DL (ref 40–75)
HDLC SERPL: 11.8 % (ref 20–50)
HGB BLD-MCNC: 12.7 G/DL (ref 14–18)
IMM GRANULOCYTES # BLD AUTO: 0.01 K/UL (ref 0–0.04)
IMM GRANULOCYTES NFR BLD AUTO: 0.2 % (ref 0–0.5)
INTERVENTRICULAR SEPTUM: 2.03 CM (ref 0.6–1.1)
LA MAJOR: 4.51 CM
LA MINOR: 4.85 CM
LA WIDTH: 3.54 CM
LDLC SERPL CALC-MCNC: 142.4 MG/DL (ref 63–159)
LEFT ATRIUM SIZE: 3.58 CM
LEFT ATRIUM VOLUME INDEX: 28.2 ML/M2
LEFT ATRIUM VOLUME: 50.35 CM3
LEFT INTERNAL DIMENSION IN SYSTOLE: 2.51 CM (ref 2.1–4)
LEFT VENTRICLE DIASTOLIC VOLUME INDEX: 29.4 ML/M2
LEFT VENTRICLE DIASTOLIC VOLUME: 52.47 ML
LEFT VENTRICLE MASS INDEX: 129 G/M2
LEFT VENTRICLE SYSTOLIC VOLUME INDEX: 12.7 ML/M2
LEFT VENTRICLE SYSTOLIC VOLUME: 22.62 ML
LEFT VENTRICULAR INTERNAL DIMENSION IN DIASTOLE: 3.55 CM (ref 3.5–6)
LEFT VENTRICULAR MASS: 229.63 G
LV LATERAL E/E' RATIO: 8.11 M/S
LV SEPTAL E/E' RATIO: 24.33 M/S
LYMPHOCYTES # BLD AUTO: 1.7 K/UL (ref 1–4.8)
LYMPHOCYTES NFR BLD: 30.1 % (ref 18–48)
MCH RBC QN AUTO: 30.2 PG (ref 27–31)
MCHC RBC AUTO-ENTMCNC: 32 G/DL (ref 32–36)
MCV RBC AUTO: 95 FL (ref 82–98)
MONOCYTES # BLD AUTO: 0.4 K/UL (ref 0.3–1)
MONOCYTES NFR BLD: 7.1 % (ref 4–15)
MV PEAK A VEL: 1.15 M/S
MV PEAK E VEL: 0.73 M/S
NEUTROPHILS # BLD AUTO: 3 K/UL (ref 1.8–7.7)
NEUTROPHILS NFR BLD: 52.5 % (ref 38–73)
NONHDLC SERPL-MCNC: 194 MG/DL
NRBC BLD-RTO: 0 /100 WBC
PISA TR MAX VEL: 2.33 M/S
PLATELET # BLD AUTO: 138 K/UL (ref 150–350)
PMV BLD AUTO: 11.1 FL (ref 9.2–12.9)
POTASSIUM SERPL-SCNC: 4.1 MMOL/L (ref 3.5–5.1)
PROT SERPL-MCNC: 7.2 G/DL (ref 6–8.4)
RA MAJOR: 4.42 CM
RA PRESSURE: 3 MMHG
RA WIDTH: 2.83 CM
RBC # BLD AUTO: 4.2 M/UL (ref 4.6–6.2)
RIGHT VENTRICULAR END-DIASTOLIC DIMENSION: 3.75 CM
SINUS: 3.53 CM
SODIUM SERPL-SCNC: 137 MMOL/L (ref 136–145)
STJ: 3.93 CM
TDI LATERAL: 0.09 M/S
TDI SEPTAL: 0.03 M/S
TDI: 0.06 M/S
TR MAX PG: 22 MMHG
TRICUSPID ANNULAR PLANE SYSTOLIC EXCURSION: 1.98 CM
TRIGL SERPL-MCNC: 258 MG/DL (ref 30–150)
TSH SERPL DL<=0.005 MIU/L-ACNC: 1.2 UIU/ML (ref 0.4–4)
TV REST PULMONARY ARTERY PRESSURE: 25 MMHG
WBC # BLD AUTO: 5.64 K/UL (ref 3.9–12.7)

## 2019-08-23 PROCEDURE — 93306 TTE W/DOPPLER COMPLETE: CPT | Mod: PBBFAC,PO | Performed by: INTERNAL MEDICINE

## 2019-08-23 PROCEDURE — 36415 COLL VENOUS BLD VENIPUNCTURE: CPT | Mod: PO

## 2019-08-23 PROCEDURE — 80061 LIPID PANEL: CPT

## 2019-08-23 PROCEDURE — 93306 TRANSTHORACIC ECHO (TTE) COMPLETE: ICD-10-PCS | Mod: 26,S$PBB,, | Performed by: INTERNAL MEDICINE

## 2019-08-23 PROCEDURE — 85025 COMPLETE CBC W/AUTO DIFF WBC: CPT

## 2019-08-23 PROCEDURE — 80053 COMPREHEN METABOLIC PANEL: CPT

## 2019-08-23 PROCEDURE — 99999 PR PBB SHADOW E&M-EST. PATIENT-LVL II: ICD-10-PCS | Mod: PBBFAC,,,

## 2019-08-23 PROCEDURE — 84443 ASSAY THYROID STIM HORMONE: CPT

## 2019-08-23 PROCEDURE — 99999 PR PBB SHADOW E&M-EST. PATIENT-LVL II: CPT | Mod: PBBFAC,,,

## 2019-08-23 PROCEDURE — 99212 OFFICE O/P EST SF 10 MIN: CPT | Mod: PBBFAC,PO,25

## 2019-08-27 ENCOUNTER — OFFICE VISIT (OUTPATIENT)
Dept: CARDIOLOGY | Facility: CLINIC | Age: 74
End: 2019-08-27
Payer: MEDICARE

## 2019-08-27 VITALS
BODY MASS INDEX: 24.77 KG/M2 | DIASTOLIC BLOOD PRESSURE: 73 MMHG | HEIGHT: 66 IN | WEIGHT: 154.13 LBS | HEART RATE: 57 BPM | SYSTOLIC BLOOD PRESSURE: 137 MMHG

## 2019-08-27 DIAGNOSIS — I25.10 CORONARY ARTERY DISEASE INVOLVING NATIVE CORONARY ARTERY OF NATIVE HEART WITHOUT ANGINA PECTORIS: Chronic | ICD-10-CM

## 2019-08-27 DIAGNOSIS — K92.2 LGI BLEED: ICD-10-CM

## 2019-08-27 DIAGNOSIS — I35.0 NONRHEUMATIC AORTIC VALVE STENOSIS: ICD-10-CM

## 2019-08-27 DIAGNOSIS — Z95.1 S/P CABG X 2: Primary | ICD-10-CM

## 2019-08-27 DIAGNOSIS — J05.10 ACUTE EPIGLOTTITIS WITHOUT OBSTRUCTION: ICD-10-CM

## 2019-08-27 DIAGNOSIS — E78.2 MIXED HYPERLIPIDEMIA: Chronic | ICD-10-CM

## 2019-08-27 DIAGNOSIS — I70.1 RAS (RENAL ARTERY STENOSIS): ICD-10-CM

## 2019-08-27 DIAGNOSIS — I73.9 PVD (PERIPHERAL VASCULAR DISEASE): Chronic | ICD-10-CM

## 2019-08-27 DIAGNOSIS — Z72.0 TOBACCO ABUSE: Chronic | ICD-10-CM

## 2019-08-27 DIAGNOSIS — I25.10 ATHEROSCLEROSIS OF NATIVE CORONARY ARTERY OF NATIVE HEART WITHOUT ANGINA PECTORIS: ICD-10-CM

## 2019-08-27 DIAGNOSIS — Z78.9 STATIN INTOLERANCE: ICD-10-CM

## 2019-08-27 DIAGNOSIS — Z89.511 S/P BKA (BELOW KNEE AMPUTATION) BILATERAL: ICD-10-CM

## 2019-08-27 DIAGNOSIS — I10 ESSENTIAL HYPERTENSION: Chronic | ICD-10-CM

## 2019-08-27 DIAGNOSIS — I25.10 CORONARY ARTERY DISEASE INVOLVING NATIVE CORONARY ARTERY WITHOUT ANGINA PECTORIS, UNSPECIFIED WHETHER NATIVE OR TRANSPLANTED HEART: ICD-10-CM

## 2019-08-27 DIAGNOSIS — Z89.512 S/P BKA (BELOW KNEE AMPUTATION) BILATERAL: ICD-10-CM

## 2019-08-27 PROCEDURE — 99999 PR PBB SHADOW E&M-EST. PATIENT-LVL III: ICD-10-PCS | Mod: PBBFAC,,, | Performed by: INTERNAL MEDICINE

## 2019-08-27 PROCEDURE — 99214 PR OFFICE/OUTPT VISIT, EST, LEVL IV, 30-39 MIN: ICD-10-PCS | Mod: S$PBB,,, | Performed by: INTERNAL MEDICINE

## 2019-08-27 PROCEDURE — 99214 OFFICE O/P EST MOD 30 MIN: CPT | Mod: S$PBB,,, | Performed by: INTERNAL MEDICINE

## 2019-08-27 PROCEDURE — 99999 PR PBB SHADOW E&M-EST. PATIENT-LVL III: CPT | Mod: PBBFAC,,, | Performed by: INTERNAL MEDICINE

## 2019-08-27 PROCEDURE — 99213 OFFICE O/P EST LOW 20 MIN: CPT | Mod: PBBFAC,PO | Performed by: INTERNAL MEDICINE

## 2019-08-27 RX ORDER — AMLODIPINE BESYLATE 5 MG/1
5 TABLET ORAL NIGHTLY
Qty: 90 TABLET | Refills: 6 | Status: SHIPPED | OUTPATIENT
Start: 2019-08-27 | End: 2020-11-04

## 2019-08-27 RX ORDER — CLOPIDOGREL BISULFATE 75 MG/1
75 TABLET ORAL DAILY
Qty: 90 TABLET | Refills: 6 | Status: SHIPPED | OUTPATIENT
Start: 2019-08-27 | End: 2022-03-21

## 2019-08-27 RX ORDER — CARVEDILOL 25 MG/1
25 TABLET ORAL 2 TIMES DAILY
Qty: 180 TABLET | Refills: 5 | Status: SHIPPED | OUTPATIENT
Start: 2019-08-27 | End: 2020-11-04

## 2019-08-27 RX ORDER — LOSARTAN POTASSIUM AND HYDROCHLOROTHIAZIDE 25; 100 MG/1; MG/1
1 TABLET ORAL DAILY
Qty: 90 TABLET | Refills: 3 | Status: SHIPPED | OUTPATIENT
Start: 2019-08-27 | End: 2020-11-04
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
## 2020-08-06 ENCOUNTER — TELEPHONE (OUTPATIENT)
Dept: CARDIOLOGY | Facility: CLINIC | Age: 75
End: 2020-08-06

## 2020-08-06 NOTE — TELEPHONE ENCOUNTER
----- Message from Jordan Vicente sent at 8/6/2020  3:47 PM CDT -----  Contact: pt  Type:  Sooner Apoointment Request    Caller is requesting a sooner appointment.  Caller declined first available appointment listed below.  Caller will not accept being placed on the waitlist and is requesting a message be sent to doctor.    Name of Caller:  pt    Best Call Back Number:  [phone]    Additional Information:  pt is concerned about covid19 and does not want to do the tests on 8.27.20. pt unsure if r/s Is needed or just cancelling. Please call to advise

## 2020-08-18 ENCOUNTER — TELEPHONE (OUTPATIENT)
Dept: CARDIOLOGY | Facility: CLINIC | Age: 75
End: 2020-08-18

## 2020-08-18 NOTE — TELEPHONE ENCOUNTER
----- Message from Annalee Pettit sent at 8/18/2020  8:57 AM CDT -----  Patient's daughter, Stephanie, is calling regarding the upcoming tests and appt with you.  She said that he does not want to come in right now due to covid, she wants to reschedule to December.  I cannot reschedule because the orders are expiring.  Please call her (she said her dad does not hear well and does not answer the phone) at [phone].  Thank you!

## 2020-08-18 NOTE — TELEPHONE ENCOUNTER
Spoke to daughter and rescheduled testing for January and Dr Kelley appointment for February; daughter accepted appt date and time

## 2020-09-22 ENCOUNTER — PATIENT OUTREACH (OUTPATIENT)
Dept: ADMINISTRATIVE | Facility: HOSPITAL | Age: 75
End: 2020-09-22

## 2020-10-12 ENCOUNTER — PES CALL (OUTPATIENT)
Dept: ADMINISTRATIVE | Facility: CLINIC | Age: 75
End: 2020-10-12

## 2020-11-03 DIAGNOSIS — Z72.0 TOBACCO ABUSE: Chronic | ICD-10-CM

## 2020-11-03 DIAGNOSIS — Z95.1 S/P CABG X 2: ICD-10-CM

## 2020-11-03 DIAGNOSIS — I25.10 CORONARY ARTERY DISEASE INVOLVING NATIVE CORONARY ARTERY WITHOUT ANGINA PECTORIS, UNSPECIFIED WHETHER NATIVE OR TRANSPLANTED HEART: ICD-10-CM

## 2020-11-03 DIAGNOSIS — K92.2 LGI BLEED: ICD-10-CM

## 2020-11-03 DIAGNOSIS — I10 ESSENTIAL HYPERTENSION: Chronic | ICD-10-CM

## 2020-11-04 RX ORDER — CARVEDILOL 25 MG/1
TABLET ORAL
Qty: 180 TABLET | Refills: 4 | Status: SHIPPED | OUTPATIENT
Start: 2020-11-04 | End: 2021-12-03

## 2020-11-04 RX ORDER — LOSARTAN POTASSIUM AND HYDROCHLOROTHIAZIDE 25; 100 MG/1; MG/1
TABLET ORAL
Qty: 90 TABLET | Refills: 4 | Status: SHIPPED | OUTPATIENT
Start: 2020-11-04 | End: 2021-12-03

## 2020-11-04 RX ORDER — AMLODIPINE BESYLATE 5 MG/1
TABLET ORAL
Qty: 90 TABLET | Refills: 4 | Status: SHIPPED | OUTPATIENT
Start: 2020-11-04 | End: 2021-12-03

## 2021-02-03 DIAGNOSIS — K92.2 LGI BLEED: ICD-10-CM

## 2021-02-03 DIAGNOSIS — I25.10 CORONARY ARTERY DISEASE DUE TO LIPID RICH PLAQUE: Chronic | ICD-10-CM

## 2021-02-03 DIAGNOSIS — I25.83 CORONARY ARTERY DISEASE DUE TO LIPID RICH PLAQUE: Chronic | ICD-10-CM

## 2021-02-03 DIAGNOSIS — Z72.0 TOBACCO ABUSE: Chronic | ICD-10-CM

## 2021-02-03 DIAGNOSIS — Z95.1 S/P CABG X 2: ICD-10-CM

## 2021-02-03 RX ORDER — CLONIDINE HYDROCHLORIDE 0.1 MG/1
0.1 TABLET ORAL EVERY 6 HOURS PRN
Qty: 60 TABLET | Refills: 4 | Status: SHIPPED | OUTPATIENT
Start: 2021-02-03 | End: 2022-03-21

## 2021-06-12 ENCOUNTER — PATIENT OUTREACH (OUTPATIENT)
Dept: ADMINISTRATIVE | Facility: HOSPITAL | Age: 76
End: 2021-06-12

## 2022-03-21 DIAGNOSIS — K92.2 LGI BLEED: ICD-10-CM

## 2022-03-21 DIAGNOSIS — I25.10 CORONARY ARTERY DISEASE DUE TO LIPID RICH PLAQUE: Chronic | ICD-10-CM

## 2022-03-21 DIAGNOSIS — Z72.0 TOBACCO ABUSE: Chronic | ICD-10-CM

## 2022-03-21 DIAGNOSIS — E78.2 MIXED HYPERLIPIDEMIA: Chronic | ICD-10-CM

## 2022-03-21 DIAGNOSIS — Z95.1 S/P CABG X 2: ICD-10-CM

## 2022-03-21 DIAGNOSIS — I25.83 CORONARY ARTERY DISEASE DUE TO LIPID RICH PLAQUE: Chronic | ICD-10-CM

## 2022-03-21 DIAGNOSIS — I25.10 CORONARY ARTERY DISEASE INVOLVING NATIVE CORONARY ARTERY WITHOUT ANGINA PECTORIS, UNSPECIFIED WHETHER NATIVE OR TRANSPLANTED HEART: ICD-10-CM

## 2022-03-21 DIAGNOSIS — I10 ESSENTIAL HYPERTENSION: Chronic | ICD-10-CM

## 2022-03-21 RX ORDER — CLOPIDOGREL BISULFATE 75 MG/1
TABLET ORAL
Qty: 90 TABLET | Refills: 4 | Status: SHIPPED | OUTPATIENT
Start: 2022-03-21

## 2022-03-21 RX ORDER — CLONIDINE HYDROCHLORIDE 0.1 MG/1
0.1 TABLET ORAL EVERY 6 HOURS PRN
Qty: 60 TABLET | Refills: 4 | Status: SHIPPED | OUTPATIENT
Start: 2022-03-21 | End: 2023-03-20

## 2022-05-26 ENCOUNTER — TELEPHONE (OUTPATIENT)
Dept: CARDIOLOGY | Facility: CLINIC | Age: 77
End: 2022-05-26
Payer: MEDICARE

## 2022-05-26 NOTE — TELEPHONE ENCOUNTER
----- Message from Mariluz Alonso sent at 5/26/2022 12:31 PM CDT -----  Type:  Patient Returning Call    Who Called:  daughter  Who Left Message for Patient:  Poppy   Does the patient know what this is regarding?:  no  Best Call Back Number:  [phone]   Additional Information:  please advise-thank you

## 2022-06-06 ENCOUNTER — OFFICE VISIT (OUTPATIENT)
Dept: CARDIOLOGY | Facility: CLINIC | Age: 77
End: 2022-06-06
Payer: MEDICARE

## 2022-06-06 ENCOUNTER — LAB VISIT (OUTPATIENT)
Dept: LAB | Facility: HOSPITAL | Age: 77
End: 2022-06-06
Attending: INTERNAL MEDICINE
Payer: MEDICARE

## 2022-06-06 VITALS
BODY MASS INDEX: 23.74 KG/M2 | SYSTOLIC BLOOD PRESSURE: 136 MMHG | HEIGHT: 66 IN | HEART RATE: 69 BPM | WEIGHT: 147.69 LBS | DIASTOLIC BLOOD PRESSURE: 62 MMHG

## 2022-06-06 DIAGNOSIS — I08.0 MITRAL REGURGITATION AND AORTIC STENOSIS: Primary | ICD-10-CM

## 2022-06-06 DIAGNOSIS — I25.10 CORONARY ARTERY DISEASE INVOLVING NATIVE CORONARY ARTERY OF NATIVE HEART WITHOUT ANGINA PECTORIS: ICD-10-CM

## 2022-06-06 DIAGNOSIS — R09.89 RIGHT CAROTID BRUIT: ICD-10-CM

## 2022-06-06 DIAGNOSIS — E78.00 PURE HYPERCHOLESTEROLEMIA: ICD-10-CM

## 2022-06-06 DIAGNOSIS — Z01.810 ENCOUNTER FOR PRE-OPERATIVE CARDIOVASCULAR CLEARANCE: ICD-10-CM

## 2022-06-06 DIAGNOSIS — E78.00 PURE HYPERCHOLESTEROLEMIA: Chronic | ICD-10-CM

## 2022-06-06 DIAGNOSIS — I10 PRIMARY HYPERTENSION: Chronic | ICD-10-CM

## 2022-06-06 DIAGNOSIS — Z79.02 LONG TERM (CURRENT) USE OF ANTITHROMBOTICS/ANTIPLATELETS: Chronic | ICD-10-CM

## 2022-06-06 DIAGNOSIS — Z79.02 LONG TERM (CURRENT) USE OF ANTITHROMBOTICS/ANTIPLATELETS: ICD-10-CM

## 2022-06-06 DIAGNOSIS — I25.10 CORONARY ARTERY DISEASE INVOLVING NATIVE CORONARY ARTERY OF NATIVE HEART WITHOUT ANGINA PECTORIS: Chronic | ICD-10-CM

## 2022-06-06 DIAGNOSIS — Z71.6 TOBACCO ABUSE COUNSELING: Chronic | ICD-10-CM

## 2022-06-06 LAB
ALBUMIN SERPL BCP-MCNC: 3.9 G/DL (ref 3.5–5.2)
ALP SERPL-CCNC: 60 U/L (ref 55–135)
ALT SERPL W/O P-5'-P-CCNC: 16 U/L (ref 10–44)
ANION GAP SERPL CALC-SCNC: 10 MMOL/L (ref 8–16)
AST SERPL-CCNC: 12 U/L (ref 10–40)
BASOPHILS # BLD AUTO: 0.06 K/UL (ref 0–0.2)
BASOPHILS NFR BLD: 0.9 % (ref 0–1.9)
BILIRUB SERPL-MCNC: 0.8 MG/DL (ref 0.1–1)
BUN SERPL-MCNC: 46 MG/DL (ref 8–23)
CALCIUM SERPL-MCNC: 9.3 MG/DL (ref 8.7–10.5)
CHLORIDE SERPL-SCNC: 103 MMOL/L (ref 95–110)
CHOLEST SERPL-MCNC: 225 MG/DL (ref 120–199)
CHOLEST/HDLC SERPL: 9 {RATIO} (ref 2–5)
CO2 SERPL-SCNC: 24 MMOL/L (ref 23–29)
CREAT SERPL-MCNC: 2.4 MG/DL (ref 0.5–1.4)
DIFFERENTIAL METHOD: ABNORMAL
EOSINOPHIL # BLD AUTO: 0.4 K/UL (ref 0–0.5)
EOSINOPHIL NFR BLD: 5.9 % (ref 0–8)
ERYTHROCYTE [DISTWIDTH] IN BLOOD BY AUTOMATED COUNT: 12.3 % (ref 11.5–14.5)
EST. GFR  (AFRICAN AMERICAN): 29.4 ML/MIN/1.73 M^2
EST. GFR  (NON AFRICAN AMERICAN): 25.4 ML/MIN/1.73 M^2
GLUCOSE SERPL-MCNC: 97 MG/DL (ref 70–110)
HCT VFR BLD AUTO: 37 % (ref 40–54)
HDLC SERPL-MCNC: 25 MG/DL (ref 40–75)
HDLC SERPL: 11.1 % (ref 20–50)
HGB BLD-MCNC: 12.7 G/DL (ref 14–18)
IMM GRANULOCYTES # BLD AUTO: 0.01 K/UL (ref 0–0.04)
IMM GRANULOCYTES NFR BLD AUTO: 0.1 % (ref 0–0.5)
LDLC SERPL CALC-MCNC: 161 MG/DL (ref 63–159)
LYMPHOCYTES # BLD AUTO: 1.9 K/UL (ref 1–4.8)
LYMPHOCYTES NFR BLD: 27.4 % (ref 18–48)
MCH RBC QN AUTO: 30.9 PG (ref 27–31)
MCHC RBC AUTO-ENTMCNC: 34.3 G/DL (ref 32–36)
MCV RBC AUTO: 90 FL (ref 82–98)
MONOCYTES # BLD AUTO: 0.6 K/UL (ref 0.3–1)
MONOCYTES NFR BLD: 8.9 % (ref 4–15)
NEUTROPHILS # BLD AUTO: 4 K/UL (ref 1.8–7.7)
NEUTROPHILS NFR BLD: 56.8 % (ref 38–73)
NONHDLC SERPL-MCNC: 200 MG/DL
NRBC BLD-RTO: 0 /100 WBC
PLATELET # BLD AUTO: 129 K/UL (ref 150–450)
PMV BLD AUTO: 11.3 FL (ref 9.2–12.9)
POTASSIUM SERPL-SCNC: 5 MMOL/L (ref 3.5–5.1)
PROT SERPL-MCNC: 7.1 G/DL (ref 6–8.4)
RBC # BLD AUTO: 4.11 M/UL (ref 4.6–6.2)
SODIUM SERPL-SCNC: 137 MMOL/L (ref 136–145)
TRIGL SERPL-MCNC: 195 MG/DL (ref 30–150)
WBC # BLD AUTO: 6.96 K/UL (ref 3.9–12.7)

## 2022-06-06 PROCEDURE — 36415 COLL VENOUS BLD VENIPUNCTURE: CPT | Mod: PO | Performed by: INTERNAL MEDICINE

## 2022-06-06 PROCEDURE — 99214 OFFICE O/P EST MOD 30 MIN: CPT | Mod: S$GLB,,, | Performed by: INTERNAL MEDICINE

## 2022-06-06 PROCEDURE — 93010 EKG 12-LEAD: ICD-10-PCS | Mod: S$GLB,,, | Performed by: INTERNAL MEDICINE

## 2022-06-06 PROCEDURE — 1125F PR PAIN SEVERITY QUANTIFIED, PAIN PRESENT: ICD-10-PCS | Mod: CPTII,S$GLB,, | Performed by: INTERNAL MEDICINE

## 2022-06-06 PROCEDURE — 3288F PR FALLS RISK ASSESSMENT DOCUMENTED: ICD-10-PCS | Mod: CPTII,S$GLB,, | Performed by: INTERNAL MEDICINE

## 2022-06-06 PROCEDURE — 3075F SYST BP GE 130 - 139MM HG: CPT | Mod: CPTII,S$GLB,, | Performed by: INTERNAL MEDICINE

## 2022-06-06 PROCEDURE — 93010 ELECTROCARDIOGRAM REPORT: CPT | Mod: S$GLB,,, | Performed by: INTERNAL MEDICINE

## 2022-06-06 PROCEDURE — 3288F FALL RISK ASSESSMENT DOCD: CPT | Mod: CPTII,S$GLB,, | Performed by: INTERNAL MEDICINE

## 2022-06-06 PROCEDURE — 1101F PT FALLS ASSESS-DOCD LE1/YR: CPT | Mod: CPTII,S$GLB,, | Performed by: INTERNAL MEDICINE

## 2022-06-06 PROCEDURE — 99499 UNLISTED E&M SERVICE: CPT | Mod: S$GLB,,, | Performed by: INTERNAL MEDICINE

## 2022-06-06 PROCEDURE — 93005 ELECTROCARDIOGRAM TRACING: CPT | Mod: PO

## 2022-06-06 PROCEDURE — 85025 COMPLETE CBC W/AUTO DIFF WBC: CPT | Performed by: INTERNAL MEDICINE

## 2022-06-06 PROCEDURE — 1125F AMNT PAIN NOTED PAIN PRSNT: CPT | Mod: CPTII,S$GLB,, | Performed by: INTERNAL MEDICINE

## 2022-06-06 PROCEDURE — 80061 LIPID PANEL: CPT | Performed by: INTERNAL MEDICINE

## 2022-06-06 PROCEDURE — 99999 PR PBB SHADOW E&M-EST. PATIENT-LVL III: CPT | Mod: PBBFAC,,, | Performed by: INTERNAL MEDICINE

## 2022-06-06 PROCEDURE — 99499 RISK ADDL DX/OHS AUDIT: ICD-10-PCS | Mod: S$GLB,,, | Performed by: INTERNAL MEDICINE

## 2022-06-06 PROCEDURE — 3078F PR MOST RECENT DIASTOLIC BLOOD PRESSURE < 80 MM HG: ICD-10-PCS | Mod: CPTII,S$GLB,, | Performed by: INTERNAL MEDICINE

## 2022-06-06 PROCEDURE — 99214 PR OFFICE/OUTPT VISIT, EST, LEVL IV, 30-39 MIN: ICD-10-PCS | Mod: S$GLB,,, | Performed by: INTERNAL MEDICINE

## 2022-06-06 PROCEDURE — 3078F DIAST BP <80 MM HG: CPT | Mod: CPTII,S$GLB,, | Performed by: INTERNAL MEDICINE

## 2022-06-06 PROCEDURE — 99999 PR PBB SHADOW E&M-EST. PATIENT-LVL III: ICD-10-PCS | Mod: PBBFAC,,, | Performed by: INTERNAL MEDICINE

## 2022-06-06 PROCEDURE — 1101F PR PT FALLS ASSESS DOC 0-1 FALLS W/OUT INJ PAST YR: ICD-10-PCS | Mod: CPTII,S$GLB,, | Performed by: INTERNAL MEDICINE

## 2022-06-06 PROCEDURE — 80053 COMPREHEN METABOLIC PANEL: CPT | Performed by: INTERNAL MEDICINE

## 2022-06-06 PROCEDURE — 3075F PR MOST RECENT SYSTOLIC BLOOD PRESS GE 130-139MM HG: ICD-10-PCS | Mod: CPTII,S$GLB,, | Performed by: INTERNAL MEDICINE

## 2022-06-06 NOTE — PROGRESS NOTES
Subjective:    Patient ID:  Evaristo Cardoso is a 76 y.o. male who presents for Coronary Artery Disease and Valvular Heart Disease (PREOP CLEARANCE)        HPI    PATIENT IS NEW TO ME WAS FOLLOWED BY DR. ROWE, NO F/U SINCE 8/2019,  HISTORY OF CABG PVD , RENAL ARTERY STENOSIS, BKA, ECHO FROM 2018 SHOWED MODERATE AORTIC STENOSIS MILD AI MILD MR, NEEDS CATARACT SURGERY B, STILL SMOKES,NO LABS IN ABOUT 3 YEARS,  SEE ROS  Past Medical History:   Diagnosis Date    Aortic stenosis  5/30/2013 2017 Moderate to severe aortic stenosis, EJ = 0.96 cm2, peak velocity = 3.02 m/s, mean gradient = 21 mmHg.     Hep C w/o coma, chronic     History of non-ST elevation myocardial infarction (NSTEMI) 4/11/2013    3/1/13 of Diag cutting ptca     HTN (hypertension) 2/25/2013    LGI bleed 4/11/2013    3/13     PVD (peripheral vascular disease) 2/25/2013 1/13 rt EIA- occluded, left EIA 80%     LUIS ALFREDO (renal artery stenosis) 4/11/2013    3/13 40% bilateral LUIS ALFREDO     S/P BKA (below knee amputation) bilateral 2/25/2013    S/P CABG x 2 2/25/2013 1/13 VG-RCA, LIMA-LAD     Statin intolerance 8/28/2018     Past Surgical History:   Procedure Laterality Date    bka  1968    bilateral     Family History   Problem Relation Age of Onset    Breast cancer Daughter     Cancer Daughter     Stroke Father      Social History     Socioeconomic History    Marital status:    Tobacco Use    Smoking status: Current Every Day Smoker     Packs/day: 0.25     Types: Cigarettes    Smokeless tobacco: Never Used       Review of patient's allergies indicates:   Allergen Reactions    Crestor [rosuvastatin]      myalgia    Lipitor [atorvastatin]      myalgia    Liptruzet [ezetimibe-atorvastatin]     Lisinopril Rash       Current Outpatient Medications:     amLODIPine (NORVASC) 5 MG tablet, TAKE ONE TABLET BY MOUTH IN THE EVENING, Disp: 90 tablet, Rfl: 4    aspirin (ECOTRIN) 81 MG EC tablet, Take 81 mg by mouth once daily.  , Disp: , Rfl:  "    carvediloL (COREG) 25 MG tablet, TAKE ONE TABLET BY MOUTH TWICE DAILY., Disp: 180 tablet, Rfl: 4    cloNIDine (CATAPRES) 0.1 MG tablet, TAKE 1 TABLET (0.1 MG TOTAL) BY MOUTH EVERY 6 (SIX) HOURS AS NEEDED. PRN SBP > 160, Disp: 60 tablet, Rfl: 4    clopidogreL (PLAVIX) 75 mg tablet, TAKE ONE TABLET BY MOUTH DAILY, Disp: 90 tablet, Rfl: 4    losartan-hydrochlorothiazide 100-25 mg (HYZAAR) 100-25 mg per tablet, TAKE ONE TABLET BY MOUTH DAILY, Disp: 90 tablet, Rfl: 4    nitroGLYCERIN (NITROSTAT) 0.4 MG SL tablet, Place 1 tablet (0.4 mg total) under the tongue every 5 (five) minutes as needed., Disp: 25 tablet, Rfl: 6    Review of Systems   Constitutional: Negative. Negative for chills, decreased appetite and diaphoresis.   HENT: Positive for hearing loss. Negative for congestion.    Eyes: Positive for blurred vision. Negative for redness.   Cardiovascular: Positive for dyspnea on exertion (MILD) and near-syncope. Negative for chest pain, cyanosis, irregular heartbeat and leg swelling. Claudication: BKA.   Respiratory: Negative for cough.    Endocrine: Negative.    Hematologic/Lymphatic: Negative for adenopathy. Does not bruise/bleed easily.   Skin: Negative for color change and rash.   Musculoskeletal: Positive for joint pain. Negative for falls.   Gastrointestinal: Negative for abdominal pain, dysphagia, jaundice and melena.   Genitourinary: Negative for dysuria and flank pain.   Neurological: Negative for brief paralysis and focal weakness.   Psychiatric/Behavioral: Negative for altered mental status and depression.        Objective:      Vitals:    06/06/22 1414 06/06/22 1436   BP: (!) 150/78 136/62   Pulse: 69    Weight: 67 kg (147 lb 11.3 oz)    Height: 5' 6" (1.676 m)    PainSc: 10-Worst pain ever    PainLoc: Neck      Body mass index is 23.84 kg/m².    Physical Exam  Constitutional:       Appearance: Normal appearance.   Eyes:      General: No scleral icterus.     Extraocular Movements: Extraocular " movements intact.   Neck:      Vascular: Carotid bruit present. No JVD.   Cardiovascular:      Rate and Rhythm: Normal rate and regular rhythm.      Pulses:           Carotid pulses are 2+ on the right side with bruit and 2+ on the left side.       Radial pulses are 2+ on the right side and 2+ on the left side.        Femoral pulses are 1+ on the right side and 1+ on the left side with bruit.     Heart sounds: Murmur heard.    Harsh midsystolic murmur is present with a grade of 2/6 at the upper right sternal border radiating to the neck.    No friction rub. No gallop.      Comments: BKA  Chest:       Abdominal:      General: There is no distension.      Palpations: Abdomen is soft.   Musculoskeletal:      Cervical back: Neck supple.   Skin:     General: Skin is warm and dry.      Capillary Refill: Capillary refill takes less than 2 seconds.   Neurological:      General: No focal deficit present.      Mental Status: He is alert and oriented to person, place, and time.      Cranial Nerves: Cranial nerve deficit ([place]') present.   Psychiatric:         Mood and Affect: Mood normal.         Behavior: Behavior normal.                 ..    Chemistry        Component Value Date/Time     06/06/2022 1501    K 5.0 06/06/2022 1501     06/06/2022 1501    CO2 24 06/06/2022 1501    BUN 46 (H) 06/06/2022 1501    CREATININE 2.4 (H) 06/06/2022 1501    GLU 97 06/06/2022 1501        Component Value Date/Time    CALCIUM 9.3 06/06/2022 1501    ALKPHOS 60 06/06/2022 1501    AST 12 06/06/2022 1501    ALT 16 06/06/2022 1501    BILITOT 0.8 06/06/2022 1501    ESTGFRAFRICA 29.4 (A) 06/06/2022 1501    EGFRNONAA 25.4 (A) 06/06/2022 1501            ..  Lab Results   Component Value Date    CHOL 225 (H) 06/06/2022    CHOL 220 (H) 08/23/2019    CHOL 194 07/30/2018     Lab Results   Component Value Date    HDL 25 (L) 06/06/2022    HDL 26 (L) 08/23/2019    HDL 27 (L) 07/30/2018     Lab Results   Component Value Date    LDLCALC 161.0 (H)  06/06/2022    LDLCALC 142.4 08/23/2019    LDLCALC 133.6 07/30/2018     Lab Results   Component Value Date    TRIG 195 (H) 06/06/2022    TRIG 258 (H) 08/23/2019    TRIG 167 (H) 07/30/2018     Lab Results   Component Value Date    CHOLHDL 11.1 (L) 06/06/2022    CHOLHDL 11.8 (L) 08/23/2019    CHOLHDL 13.9 (L) 07/30/2018     ..  Lab Results   Component Value Date    WBC 6.96 06/06/2022    HGB 12.7 (L) 06/06/2022    HCT 37.0 (L) 06/06/2022    MCV 90 06/06/2022     (L) 06/06/2022       Test(s) Reviewed  I have reviewed the following in detail:  [] Stress test   [] Angiography   [x] Echocardiogram   [x] Labs   [x] Other:       Assessment:         ICD-10-CM ICD-9-CM   1. Mitral regurgitation and aortic stenosis  I08.0 396.2   2. Coronary artery disease involving native coronary artery of native heart without angina pectoris  I25.10 414.01   3. Right carotid bruit  R09.89 785.9   4. Long term (current) use of antithrombotics/antiplatelets  Z79.02 V58.63   5. Primary hypertension  I10 401.9   6. Tobacco abuse counseling  Z71.6 V65.42     305.1   7. Pure hypercholesterolemia  E78.00 272.0   8. Encounter for pre-operative cardiovascular clearance  Z01.810 V72.81     Problem List Items Addressed This Visit        Cardiac/Vascular    CAD (coronary artery disease) (Chronic)    Overview     1/13 Ld80%, cx 50%, rca 99%  3/1/13 LAD 85%, cx- 60%, ramus 60%, rca-80%, vg-rca and Lima-LAD patent           Relevant Orders    Echo    Comprehensive Metabolic Panel (Completed)    HTN (hypertension) (Chronic)    Relevant Orders    IN OFFICE EKG 12-LEAD (to [place])    Hyperlipidemia (Chronic)    Relevant Orders    Comprehensive Metabolic Panel (Completed)    Lipid Panel (Completed)    Mitral regurgitation and aortic stenosis - Primary    Overview     2017  Moderate to severe aortic stenosis, EJ = 0.96 cm2, peak velocity = 3.02 m/s, mean gradient = 21 mmHg.            Relevant Orders    Echo    Right carotid bruit    Relevant Orders    CV  Ultrasound Bilateral Doppler Carotid       Hematology    Long term (current) use of antithrombotics/antiplatelets    Relevant Orders    Comprehensive Metabolic Panel (Completed)    CBC Auto Differential (Completed)       Other    Tobacco abuse counseling      Other Visit Diagnoses     Encounter for pre-operative cardiovascular clearance               Plan:     EKG SR, RBBB, LAD, CHECK LABS, TOBACCO CESSATION, ACCEPTABLE CV RISK FOR CATARACT SURGERY,ALL CV CLINICALLY STABLE, NO ANGINA, NO HF, NO TIA, NO CLINICAL ARRHYTHMIA,CONTINUE CURRENT MEDS, EDUCATION, DIET, EXERCISE AS MUCH AS POSSIBLE, RETURN TO CLINIC IN 6 MO WITH AN ECHO REASSESS VALVULAR DISEASE, CHECK CAROTID ULTRASOUND DISCUSSED PLAN WITH THE PATIENT AND HIS, DAUGHTER      Mitral regurgitation and aortic stenosis  -     Echo    Coronary artery disease involving native coronary artery of native heart without angina pectoris  -     Echo  -     Comprehensive Metabolic Panel; Future; Expected date: 06/06/2022    Right carotid bruit  Comments:  CAROTID ULTRASOUND  Orders:  -     CV Ultrasound Bilateral Doppler Carotid; Future    Long term (current) use of antithrombotics/antiplatelets  -     Comprehensive Metabolic Panel; Future; Expected date: 06/06/2022  -     CBC Auto Differential; Future; Expected date: 06/06/2022    Primary hypertension  Comments:  CONTROLLED  Orders:  -     IN OFFICE EKG 12-LEAD (to [place])    Tobacco abuse counseling    Pure hypercholesterolemia  -     Comprehensive Metabolic Panel; Future; Expected date: 06/06/2022  -     Lipid Panel; Future; Expected date: 06/06/2022    Encounter for pre-operative cardiovascular clearance    RTC Low level/low impact aerobic exercise 5x's/wk. Heart healthy diet and risk factor modification.    See labs and med orders.    Aerobic exercise 5x's/wk. Heart healthy diet and risk factor modification.    See labs and med orders.

## 2022-06-16 ENCOUNTER — TELEPHONE (OUTPATIENT)
Dept: CARDIOLOGY | Facility: CLINIC | Age: 77
End: 2022-06-16
Payer: MEDICARE

## 2022-06-16 NOTE — TELEPHONE ENCOUNTER
----- Message from Aretha Franks MA sent at 6/16/2022  2:55 PM CDT -----  Type: Needs Medical Advice  Who Called:  Stephanie Erickson    Best Call Back Number: [phone]  Additional Information: would like to know the status of Cardiac Clearance Form? Please advise--thank you

## 2022-06-22 ENCOUNTER — PATIENT MESSAGE (OUTPATIENT)
Dept: CARDIOLOGY | Facility: CLINIC | Age: 77
End: 2022-06-22
Payer: MEDICARE

## 2022-08-25 ENCOUNTER — PATIENT MESSAGE (OUTPATIENT)
Dept: CARDIOLOGY | Facility: CLINIC | Age: 77
End: 2022-08-25
Payer: MEDICARE

## 2022-09-01 ENCOUNTER — TELEPHONE (OUTPATIENT)
Dept: CARDIOLOGY | Facility: CLINIC | Age: 77
End: 2022-09-01
Payer: MEDICARE

## 2022-09-01 NOTE — TELEPHONE ENCOUNTER
Pt is schedule to have L eye cataract surgery . Cardiac clearance has been requested. Please advise     Pt last OV was 6/6/22       Fax to [phone]

## 2022-09-05 ENCOUNTER — PATIENT MESSAGE (OUTPATIENT)
Dept: CARDIOLOGY | Facility: CLINIC | Age: 77
End: 2022-09-05
Payer: MEDICARE

## 2022-12-06 ENCOUNTER — CLINICAL SUPPORT (OUTPATIENT)
Dept: CARDIOLOGY | Facility: HOSPITAL | Age: 77
End: 2022-12-06
Attending: INTERNAL MEDICINE
Payer: MEDICARE

## 2022-12-06 VITALS — BODY MASS INDEX: 23.63 KG/M2 | HEART RATE: 67 BPM | WEIGHT: 147 LBS | HEIGHT: 66 IN

## 2022-12-06 DIAGNOSIS — R09.89 RIGHT CAROTID BRUIT: ICD-10-CM

## 2022-12-06 LAB
ASCENDING AORTA: 3.72 CM
AV INDEX (PROSTH): 0.29
AV MEAN GRADIENT: 26 MMHG
AV PEAK GRADIENT: 44 MMHG
AV VALVE AREA: 1.01 CM2
AV VELOCITY RATIO: 0.31
BSA FOR ECHO PROCEDURE: 1.76 M2
CV ECHO LV RWT: 0.67 CM
DOP CALC AO PEAK VEL: 3.32 M/S
DOP CALC AO VTI: 83.4 CM
DOP CALC LVOT AREA: 3.4 CM2
DOP CALC LVOT DIAMETER: 2.09 CM
DOP CALC LVOT PEAK VEL: 1.02 M/S
DOP CALC LVOT STROKE VOLUME: 84.35 CM3
DOP CALC MV VTI: 44.3 CM
DOP CALCLVOT PEAK VEL VTI: 24.6 CM
E WAVE DECELERATION TIME: 282.67 MSEC
E/A RATIO: 0.67
E/E' RATIO: 14.15 M/S
ECHO LV POSTERIOR WALL: 1.39 CM (ref 0.6–1.1)
EJECTION FRACTION: 65 %
FRACTIONAL SHORTENING: 36 % (ref 28–44)
INTERVENTRICULAR SEPTUM: 1.37 CM (ref 0.6–1.1)
IVRT: 145.58 MSEC
LA MAJOR: 4.29 CM
LA MINOR: 4.73 CM
LA WIDTH: 3.5 CM
LEFT ARM DIASTOLIC BLOOD PRESSURE: 62 MMHG
LEFT ARM SYSTOLIC BLOOD PRESSURE: 136 MMHG
LEFT ATRIUM SIZE: 3.41 CM
LEFT ATRIUM VOLUME INDEX: 26.1 ML/M2
LEFT ATRIUM VOLUME: 45.64 CM3
LEFT CBA DIAS: 13 CM/S
LEFT CBA SYS: 130 CM/S
LEFT CCA DIST DIAS: 17 CM/S
LEFT CCA DIST SYS: 115 CM/S
LEFT CCA MID DIAS: 18 CM/S
LEFT CCA MID SYS: 111 CM/S
LEFT CCA PROX DIAS: 14 CM/S
LEFT CCA PROX SYS: 76 CM/S
LEFT ECA DIAS: 20 CM/S
LEFT ECA SYS: 244 CM/S
LEFT ICA DIST DIAS: 14 CM/S
LEFT ICA DIST SYS: 59 CM/S
LEFT ICA MID DIAS: 27 CM/S
LEFT ICA MID SYS: 97 CM/S
LEFT ICA PROX DIAS: 17 CM/S
LEFT ICA PROX SYS: 109 CM/S
LEFT INTERNAL DIMENSION IN SYSTOLE: 2.65 CM (ref 2.1–4)
LEFT VENTRICLE DIASTOLIC VOLUME INDEX: 44.1 ML/M2
LEFT VENTRICLE DIASTOLIC VOLUME: 77.18 ML
LEFT VENTRICLE MASS INDEX: 124 G/M2
LEFT VENTRICLE SYSTOLIC VOLUME INDEX: 14.7 ML/M2
LEFT VENTRICLE SYSTOLIC VOLUME: 25.72 ML
LEFT VENTRICULAR INTERNAL DIMENSION IN DIASTOLE: 4.17 CM (ref 3.5–6)
LEFT VENTRICULAR MASS: 217.17 G
LEFT VERTEBRAL DIAS: 9 CM/S
LEFT VERTEBRAL SYS: 48 CM/S
LV LATERAL E/E' RATIO: 13.14 M/S
LV SEPTAL E/E' RATIO: 15.33 M/S
LVOT MG: 2.62 MMHG
LVOT MV: 0.78 CM/S
MV MEAN GRADIENT: 4 MMHG
MV PEAK A VEL: 1.37 M/S
MV PEAK E VEL: 0.92 M/S
MV PEAK GRADIENT: 8 MMHG
MV STENOSIS PRESSURE HALF TIME: 81.98 MS
MV VALVE AREA BY CONTINUITY EQUATION: 1.9 CM2
MV VALVE AREA P 1/2 METHOD: 2.68 CM2
OHS CV CAROTID RIGHT ICA EDV HIGHEST: 45
OHS CV CAROTID ULTRASOUND LEFT ICA/CCA RATIO: 0.95
OHS CV CAROTID ULTRASOUND RIGHT ICA/CCA RATIO: 3
OHS CV PV CAROTID LEFT HIGHEST CCA: 115
OHS CV PV CAROTID LEFT HIGHEST ICA: 109
OHS CV PV CAROTID RIGHT HIGHEST CCA: 61
OHS CV PV CAROTID RIGHT HIGHEST ICA: 174
OHS CV US CAROTID LEFT HIGHEST EDV: 27
PISA MRMAX VEL: 6.42 M/S
PISA TR MAX VEL: 2.62 M/S
PULM VEIN S/D RATIO: 1.46
PV PEAK D VEL: 0.35 M/S
PV PEAK S VEL: 0.51 M/S
RA MAJOR: 4.72 CM
RA PRESSURE: 3 MMHG
RA WIDTH: 2.93 CM
RIGHT ARM DIASTOLIC BLOOD PRESSURE: 62 MMHG
RIGHT ARM SYSTOLIC BLOOD PRESSURE: 136 MMHG
RIGHT CBA DIAS: 15 CM/S
RIGHT CBA SYS: 64 CM/S
RIGHT CCA DIST DIAS: 11 CM/S
RIGHT CCA DIST SYS: 58 CM/S
RIGHT CCA MID DIAS: 11 CM/S
RIGHT CCA MID SYS: 61 CM/S
RIGHT CCA PROX DIAS: 10 CM/S
RIGHT CCA PROX SYS: 55 CM/S
RIGHT ECA DIAS: 13 CM/S
RIGHT ECA SYS: 125 CM/S
RIGHT ICA DIST DIAS: 45 CM/S
RIGHT ICA DIST SYS: 174 CM/S
RIGHT ICA MID DIAS: 18 CM/S
RIGHT ICA MID SYS: 65 CM/S
RIGHT ICA PROX DIAS: 12 CM/S
RIGHT ICA PROX SYS: 54 CM/S
RIGHT VENTRICULAR END-DIASTOLIC DIMENSION: 3.59 CM
RIGHT VENTRICULAR LENGTH IN DIASTOLE (APICAL 4-CHAMBER VIEW): 4.22 CM
RIGHT VERTEBRAL DIAS: 17 CM/S
RIGHT VERTEBRAL SYS: 82 CM/S
RV MID DIAMA: 2.36 CM
RV TISSUE DOPPLER FREE WALL SYSTOLIC VELOCITY 1 (APICAL 4 CHAMBER VIEW): 0.01 CM/S
SINUS: 3.65 CM
STJ: 3.46 CM
TDI LATERAL: 0.07 M/S
TDI SEPTAL: 0.06 M/S
TDI: 0.07 M/S
TR MAX PG: 27 MMHG
TRICUSPID ANNULAR PLANE SYSTOLIC EXCURSION: 2.1 CM
TV REST PULMONARY ARTERY PRESSURE: 30 MMHG

## 2022-12-06 PROCEDURE — 93880 CV US DOPPLER CAROTID (CUPID ONLY): ICD-10-PCS | Mod: 26,,, | Performed by: INTERNAL MEDICINE

## 2022-12-06 PROCEDURE — 93880 EXTRACRANIAL BILAT STUDY: CPT | Mod: PO

## 2022-12-06 PROCEDURE — 93880 EXTRACRANIAL BILAT STUDY: CPT | Mod: 26,,, | Performed by: INTERNAL MEDICINE

## 2022-12-06 PROCEDURE — 93306 TTE W/DOPPLER COMPLETE: CPT | Mod: PO

## 2022-12-15 ENCOUNTER — OFFICE VISIT (OUTPATIENT)
Dept: CARDIOLOGY | Facility: CLINIC | Age: 77
End: 2022-12-15
Attending: INTERNAL MEDICINE
Payer: MEDICARE

## 2022-12-15 VITALS
WEIGHT: 147.06 LBS | HEIGHT: 66 IN | SYSTOLIC BLOOD PRESSURE: 153 MMHG | DIASTOLIC BLOOD PRESSURE: 75 MMHG | HEART RATE: 60 BPM | BODY MASS INDEX: 23.64 KG/M2

## 2022-12-15 DIAGNOSIS — Z78.9 STATIN INTOLERANCE: ICD-10-CM

## 2022-12-15 DIAGNOSIS — I73.9 PVD (PERIPHERAL VASCULAR DISEASE): Chronic | ICD-10-CM

## 2022-12-15 DIAGNOSIS — I65.21 CAROTID STENOSIS, ASYMPTOMATIC, RIGHT: Chronic | ICD-10-CM

## 2022-12-15 DIAGNOSIS — E78.00 PURE HYPERCHOLESTEROLEMIA: Chronic | ICD-10-CM

## 2022-12-15 DIAGNOSIS — I10 PRIMARY HYPERTENSION: Chronic | ICD-10-CM

## 2022-12-15 DIAGNOSIS — Z79.02 LONG TERM (CURRENT) USE OF ANTITHROMBOTICS/ANTIPLATELETS: Chronic | ICD-10-CM

## 2022-12-15 DIAGNOSIS — I08.0 MITRAL REGURGITATION AND AORTIC STENOSIS: Chronic | ICD-10-CM

## 2022-12-15 DIAGNOSIS — Z71.6 TOBACCO ABUSE COUNSELING: Chronic | ICD-10-CM

## 2022-12-15 DIAGNOSIS — I25.10 CORONARY ARTERY DISEASE INVOLVING NATIVE CORONARY ARTERY OF NATIVE HEART WITHOUT ANGINA PECTORIS: Primary | Chronic | ICD-10-CM

## 2022-12-15 PROCEDURE — 3288F PR FALLS RISK ASSESSMENT DOCUMENTED: ICD-10-PCS | Mod: CPTII,S$GLB,, | Performed by: INTERNAL MEDICINE

## 2022-12-15 PROCEDURE — 99999 PR PBB SHADOW E&M-EST. PATIENT-LVL II: ICD-10-PCS | Mod: PBBFAC,,, | Performed by: INTERNAL MEDICINE

## 2022-12-15 PROCEDURE — 99214 PR OFFICE/OUTPT VISIT, EST, LEVL IV, 30-39 MIN: ICD-10-PCS | Mod: S$GLB,,, | Performed by: INTERNAL MEDICINE

## 2022-12-15 PROCEDURE — 3288F FALL RISK ASSESSMENT DOCD: CPT | Mod: CPTII,S$GLB,, | Performed by: INTERNAL MEDICINE

## 2022-12-15 PROCEDURE — 3078F PR MOST RECENT DIASTOLIC BLOOD PRESSURE < 80 MM HG: ICD-10-PCS | Mod: CPTII,S$GLB,, | Performed by: INTERNAL MEDICINE

## 2022-12-15 PROCEDURE — 99999 PR PBB SHADOW E&M-EST. PATIENT-LVL II: CPT | Mod: PBBFAC,,, | Performed by: INTERNAL MEDICINE

## 2022-12-15 PROCEDURE — 1101F PR PT FALLS ASSESS DOC 0-1 FALLS W/OUT INJ PAST YR: ICD-10-PCS | Mod: CPTII,S$GLB,, | Performed by: INTERNAL MEDICINE

## 2022-12-15 PROCEDURE — 3077F SYST BP >= 140 MM HG: CPT | Mod: CPTII,S$GLB,, | Performed by: INTERNAL MEDICINE

## 2022-12-15 PROCEDURE — 3078F DIAST BP <80 MM HG: CPT | Mod: CPTII,S$GLB,, | Performed by: INTERNAL MEDICINE

## 2022-12-15 PROCEDURE — 1101F PT FALLS ASSESS-DOCD LE1/YR: CPT | Mod: CPTII,S$GLB,, | Performed by: INTERNAL MEDICINE

## 2022-12-15 PROCEDURE — 3077F PR MOST RECENT SYSTOLIC BLOOD PRESSURE >= 140 MM HG: ICD-10-PCS | Mod: CPTII,S$GLB,, | Performed by: INTERNAL MEDICINE

## 2022-12-15 PROCEDURE — 99214 OFFICE O/P EST MOD 30 MIN: CPT | Mod: S$GLB,,, | Performed by: INTERNAL MEDICINE

## 2022-12-15 RX ORDER — PRAVASTATIN SODIUM 20 MG/1
20 TABLET ORAL NIGHTLY
Qty: 90 TABLET | Refills: 1 | Status: SHIPPED | OUTPATIENT
Start: 2022-12-15 | End: 2023-09-18 | Stop reason: DRUGHIGH

## 2022-12-15 NOTE — PROGRESS NOTES
Subjective:    Patient ID:  Evaristo Cardoso is a 77 y.o. male who presents for Coronary Artery Disease, Hypertension, and Hyperlipidemia (6MTH F/U WITH ECHO/US)        HPI  DISCUSSED TEST, ECHO NORMAL LV FUNCTION, MODERATE TO SEVERE AORTIC STENOSIS AORTIC VALVE AREA AROUND 1.0 CENTIMETERS SQ, MILD-TO-MODERATE AI MILD MR, 40-49% RIGHT INTERNAL CAROTID ARTERY STENOSIS, LAST LDL  HDL 25, TRIGLYCERIDE 195, CMP GFR 25, HEMOGLOBIN 12.7, LEG PAIN AND OA, INTOLERANT OF STATINS, ALL OF THEM, STILL SMOKES,  SEE ROS    Past Medical History:   Diagnosis Date    Aortic stenosis  5/30/2013 2017 Moderate to severe aortic stenosis, EJ = 0.96 cm2, peak velocity = 3.02 m/s, mean gradient = 21 mmHg.     Hep C w/o coma, chronic     History of non-ST elevation myocardial infarction (NSTEMI) 4/11/2013    3/1/13 of Diag cutting ptca     HTN (hypertension) 2/25/2013    LGI bleed 4/11/2013    3/13     PVD (peripheral vascular disease) 2/25/2013 1/13 rt EIA- occluded, left EIA 80%     LUIS ALFREDO (renal artery stenosis) 4/11/2013    3/13 40% bilateral LUIS ALFREDO     S/P BKA (below knee amputation) bilateral 2/25/2013    S/P CABG x 2 2/25/2013 1/13 VG-RCA, LIMA-LAD     Statin intolerance 8/28/2018     Past Surgical History:   Procedure Laterality Date    bka  1968    bilateral     Family History   Problem Relation Age of Onset    Breast cancer Daughter     Cancer Daughter     Stroke Father      Social History     Socioeconomic History    Marital status:    Tobacco Use    Smoking status: Every Day     Packs/day: 0.25     Types: Cigarettes    Smokeless tobacco: Never       Review of patient's allergies indicates:   Allergen Reactions    Crestor [rosuvastatin]      myalgia    Lipitor [atorvastatin]      myalgia    Liptruzet [ezetimibe-atorvastatin]     Lisinopril Rash       Current Outpatient Medications:     amLODIPine (NORVASC) 5 MG tablet, TAKE ONE TABLET BY MOUTH IN THE EVENING, Disp: 90 tablet, Rfl: 4    aspirin (ECOTRIN) 81 MG  EC tablet, Take 81 mg by mouth once daily.  , Disp: , Rfl:     carvediloL (COREG) 25 MG tablet, TAKE ONE TABLET BY MOUTH TWICE DAILY., Disp: 180 tablet, Rfl: 4    cloNIDine (CATAPRES) 0.1 MG tablet, TAKE 1 TABLET (0.1 MG TOTAL) BY MOUTH EVERY 6 (SIX) HOURS AS NEEDED. PRN SBP > 160, Disp: 60 tablet, Rfl: 4    clopidogreL (PLAVIX) 75 mg tablet, TAKE ONE TABLET BY MOUTH DAILY, Disp: 90 tablet, Rfl: 4    losartan-hydrochlorothiazide 100-25 mg (HYZAAR) 100-25 mg per tablet, TAKE ONE TABLET BY MOUTH DAILY, Disp: 90 tablet, Rfl: 4    nitroGLYCERIN (NITROSTAT) 0.4 MG SL tablet, Place 1 tablet (0.4 mg total) under the tongue every 5 (five) minutes as needed., Disp: 25 tablet, Rfl: 6    evolocumab (REPATHA SURECLICK) 140 mg/mL PnIj, Inject 1 mL (140 mg total) into the skin every 14 (fourteen) days., Disp: 2 each, Rfl: 3    pravastatin (PRAVACHOL) 20 MG tablet, Take 1 tablet (20 mg total) by mouth every evening., Disp: 90 tablet, Rfl: 1    Review of Systems   Constitutional: Negative. Negative for chills, decreased appetite and diaphoresis.   HENT:  Positive for hearing loss. Negative for congestion.    Eyes:  Negative for blurred vision and redness.   Cardiovascular:  Positive for claudication (BKA). Negative for chest pain, cyanosis, dyspnea on exertion (MILD), irregular heartbeat, leg swelling and near-syncope.   Respiratory:  Negative for cough.    Endocrine: Negative.    Hematologic/Lymphatic: Negative for adenopathy. Does not bruise/bleed easily.   Skin:  Negative for color change and rash.   Musculoskeletal:  Positive for arthritis. Negative for back pain and falls.   Gastrointestinal:  Negative for abdominal pain, dysphagia, jaundice and melena.   Genitourinary:  Negative for dysuria and flank pain.   Neurological:  Negative for brief paralysis and focal weakness.   Psychiatric/Behavioral:  Negative for altered mental status and depression.    Allergic/Immunologic: Negative for hives and persistent infections.     "  Objective:      Vitals:    12/15/22 1332   BP: (!) 153/75   Pulse: 60   Weight: 66.7 kg (147 lb 0.8 oz)   Height: 5' 6" (1.676 m)     Body mass index is 23.73 kg/m².    Physical Exam  Constitutional:       Appearance: Normal appearance.   Eyes:      General: No scleral icterus.     Extraocular Movements: Extraocular movements intact.   Neck:      Vascular: Carotid bruit present. No JVD.   Cardiovascular:      Rate and Rhythm: Normal rate and regular rhythm.      Pulses:           Carotid pulses are 2+ on the right side with bruit and 2+ on the left side.       Radial pulses are 2+ on the right side and 2+ on the left side.      Heart sounds: Murmur heard.   Systolic murmur is present with a grade of 2/6 at the upper right sternal border radiating to the neck.     No friction rub. No gallop.      Comments: BKA  Pulmonary:      Effort: Prolonged expiration present.      Breath sounds: No rales.   Chest:       Abdominal:      General: There is no distension.      Palpations: Abdomen is soft.   Musculoskeletal:      Cervical back: Neck supple.      Comments: B AKA   Skin:     General: Skin is warm and dry.      Capillary Refill: Capillary refill takes less than 2 seconds.   Neurological:      General: No focal deficit present.      Mental Status: He is alert and oriented to person, place, and time.      Cranial Nerves: Cranial nerve deficit ([place]) present.   Psychiatric:         Mood and Affect: Mood normal.         Speech: Speech normal.         Behavior: Behavior normal.               ..    Chemistry        Component Value Date/Time     06/06/2022 1501    K 5.0 06/06/2022 1501     06/06/2022 1501    CO2 24 06/06/2022 1501    BUN 46 (H) 06/06/2022 1501    CREATININE 2.4 (H) 06/06/2022 1501    GLU 97 06/06/2022 1501        Component Value Date/Time    CALCIUM 9.3 06/06/2022 1501    ALKPHOS 60 06/06/2022 1501    AST 12 06/06/2022 1501    ALT 16 06/06/2022 1501    BILITOT 0.8 06/06/2022 1501    ESTGFRAFRICA " 29.4 (A) 06/06/2022 1501    EGFRNONAA 25.4 (A) 06/06/2022 1501            ..  Lab Results   Component Value Date    CHOL 225 (H) 06/06/2022    CHOL 220 (H) 08/23/2019    CHOL 194 07/30/2018     Lab Results   Component Value Date    HDL 25 (L) 06/06/2022    HDL 26 (L) 08/23/2019    HDL 27 (L) 07/30/2018     Lab Results   Component Value Date    LDLCALC 161.0 (H) 06/06/2022    LDLCALC 142.4 08/23/2019    LDLCALC 133.6 07/30/2018     Lab Results   Component Value Date    TRIG 195 (H) 06/06/2022    TRIG 258 (H) 08/23/2019    TRIG 167 (H) 07/30/2018     Lab Results   Component Value Date    CHOLHDL 11.1 (L) 06/06/2022    CHOLHDL 11.8 (L) 08/23/2019    CHOLHDL 13.9 (L) 07/30/2018     ..  Lab Results   Component Value Date    WBC 6.96 06/06/2022    HGB 12.7 (L) 06/06/2022    HCT 37.0 (L) 06/06/2022    MCV 90 06/06/2022     (L) 06/06/2022       Test(s) Reviewed  I have reviewed the following in detail:  [] Stress test   [] Angiography   [x] Echocardiogram   [x] Labs   [x] Other:       Assessment:         ICD-10-CM ICD-9-CM   1. Coronary artery disease involving native coronary artery of native heart without angina pectoris  I25.10 414.01   2. Long term (current) use of antithrombotics/antiplatelets  Z79.02 V58.63   3. Pure hypercholesterolemia  E78.00 272.0   4. Mitral regurgitation and aortic stenosis  I08.0 396.2   5. PVD (peripheral vascular disease)  I73.9 443.9   6. Primary hypertension  I10 401.9   7. Tobacco abuse counseling  Z71.6 V65.42     305.1   8. Statin intolerance  Z78.9 995.27   9. Carotid stenosis, asymptomatic, right  I65.21 433.10     Problem List Items Addressed This Visit          Cardiac/Vascular    PVD (peripheral vascular disease) (Chronic)    Overview     1/13 rt EIA- occluded, left EIA 80%         Relevant Medications    evolocumab (REPATHA SURECLICK) 140 mg/mL PnIj    CAD (coronary artery disease) - Primary (Chronic)    Overview     1/13 Ld80%, cx 50%, rca 99%  3/1/13 LAD 85%, cx- 60%,  ramus 60%, rca-80%, vg-rca and [place]-LAD patent         Relevant Medications    evolocumab (REPATHA SURECLICK) 140 mg/mL PnIj    Other Relevant Orders    Comprehensive Metabolic Panel    Lipid Panel    HTN (hypertension) (Chronic)    Relevant Orders    Comprehensive Metabolic Panel    Hyperlipidemia (Chronic)    Relevant Medications    evolocumab (REPATHA SURECLICK) 140 mg/mL PnIj    Other Relevant Orders    Comprehensive Metabolic Panel    Lipid Panel    Mitral regurgitation and aortic stenosis    Overview     2017  Moderate to severe aortic stenosis, EJ = 0.96 cm2, peak velocity = 3.02 m/s, mean gradient = 21 mmHg.          Carotid stenosis, asymptomatic, right       Hematology    Long term (current) use of antithrombotics/antiplatelets       Other    Statin intolerance    Relevant Medications    evolocumab (REPATHA SURECLICK) 140 mg/mL PnIj    Tobacco abuse counseling        Plan:     WITH DAUGHTER, RE-CHALLENGE WITH PRAVACHOL, LOW-DOSE ALL IS INTOLERANT OF MULTIPLE STATINS PLUS REPATHA, NEEDS SIGNIFICANT CHOLESTEROL REDUCTION TOBACCO CESSATION EXTENSIVE COUNSELING, NO CLEAR ANGINA PATIENT HAS MODERATE SIGNIFICANT VALVULAR DISEASE NO DEFINITE SYMPTOMS, LIMITED MOBILITY WILL MONITOR, NO OVERT HEART FAILURE NO TIA TYPE SYMPTOMS NO NEAR-SYNCOPE RETURN TO CLINIC IN FEW MONTHS WITH LABS      Coronary artery disease involving native coronary artery of native heart without angina pectoris  -     evolocumab (REPATHA SURECLICK) 140 mg/mL PnIj; Inject 1 mL (140 mg total) into the skin every 14 (fourteen) days.  Dispense: 2 each; Refill: 3  -     Comprehensive Metabolic Panel; Future; Expected date: 06/15/2023  -     Lipid Panel; Future; Expected date: 06/15/2023    Long term (current) use of antithrombotics/antiplatelets    Pure hypercholesterolemia  -     evolocumab (REPATHA SURECLICK) 140 mg/mL PnIj; Inject 1 mL (140 mg total) into the skin every 14 (fourteen) days.  Dispense: 2 each; Refill: 3  -     Comprehensive  Metabolic Panel; Future; Expected date: 06/15/2023  -     Lipid Panel; Future; Expected date: 06/15/2023    Mitral regurgitation and aortic stenosis    PVD (peripheral vascular disease)  -     evolocumab (REPATHA SURECLICK) 140 mg/mL PnIj; Inject 1 mL (140 mg total) into the skin every 14 (fourteen) days.  Dispense: 2 each; Refill: 3    Primary hypertension  -     Comprehensive Metabolic Panel; Future; Expected date: 06/15/2023    Tobacco abuse counseling    Statin intolerance  -     evolocumab (REPATHA SURECLICK) 140 mg/mL PnIj; Inject 1 mL (140 mg total) into the skin every 14 (fourteen) days.  Dispense: 2 each; Refill: 3    Carotid stenosis, asymptomatic, right    Other orders  -     pravastatin (PRAVACHOL) 20 MG tablet; Take 1 tablet (20 mg total) by mouth every evening.  Dispense: 90 tablet; Refill: 1    RTC Low level/low impact aerobic exercise 5x's/wk. Heart healthy diet and risk factor modification.    See labs and med orders.    Aerobic exercise 5x's/wk. Heart healthy diet and risk factor modification.    See labs and med orders.

## 2022-12-16 ENCOUNTER — SPECIALTY PHARMACY (OUTPATIENT)
Dept: PHARMACY | Facility: CLINIC | Age: 77
End: 2022-12-16
Payer: MEDICARE

## 2022-12-16 DIAGNOSIS — E78.00 PURE HYPERCHOLESTEROLEMIA: Primary | Chronic | ICD-10-CM

## 2022-12-16 NOTE — TELEPHONE ENCOUNTER
Repatha PA approved with [employer] from 11/16/22 - 6/16/23. Case: [id number].    BI: [employer]  Copay: $45  Initial benefit  No LIS  In network    FA declined.

## 2022-12-16 NOTE — TELEPHONE ENCOUNTER
Specialty Pharmacy - Initial Clinical Assessment    Specialty Medication Orders Linked to Encounter      Flowsheet Row Most Recent Value   Medication #1 evolocumab (REPATHA SURECLICK) 140 mg/mL PnIj (Order#[id number], Rx#[id number])          Patient Diagnosis   E78.00 - Pure hypercholesterolemia    Subjective    Evaristo Cardoso is a 77 y.o. male, who is followed by the specialty pharmacy service for management and education.    Recent Encounters       Date Type Provider Description    12/16/2022 Specialty Pharmacy Stephanie Porras PharmD Initial Clinical Assessment    12/16/2022 Specialty Pharmacy Stephanie Porras PharmD Referral Authorization          Clinical call attempts since last clinical assessment   No call attempts found.     Current Outpatient Medications   Medication Sig    amLODIPine (NORVASC) 5 MG tablet TAKE ONE TABLET BY MOUTH IN THE EVENING    aspirin (ECOTRIN) 81 MG EC tablet Take 81 mg by mouth once daily.      carvediloL (COREG) 25 MG tablet TAKE ONE TABLET BY MOUTH TWICE DAILY.    cloNIDine (CATAPRES) 0.1 MG tablet TAKE 1 TABLET (0.1 MG TOTAL) BY MOUTH EVERY 6 (SIX) HOURS AS NEEDED. PRN SBP > 160    clopidogreL (PLAVIX) 75 mg tablet TAKE ONE TABLET BY MOUTH DAILY    evolocumab (REPATHA SURECLICK) 140 mg/mL PnIj Inject 1 mL (140 mg total) into the skin every 14 (fourteen) days.    losartan-hydrochlorothiazide 100-25 mg (HYZAAR) 100-25 mg per tablet TAKE ONE TABLET BY MOUTH DAILY    nitroGLYCERIN (NITROSTAT) 0.4 MG SL tablet Place 1 tablet (0.4 mg total) under the tongue every 5 (five) minutes as needed.    pravastatin (PRAVACHOL) 20 MG tablet Take 1 tablet (20 mg total) by mouth every evening.   Last reviewed on 8/27/2019 10:44 AM by Vernon Kelley MD    Review of patient's allergies indicates:   Allergen Reactions    Crestor [rosuvastatin]      myalgia    Lipitor [atorvastatin]      myalgia    Liptruzet [ezetimibe-atorvastatin]     Lisinopril Rash   Last reviewed on  12/15/2022 1:32 PM by  Tiffany Naranjo    Drug Interactions    Drug interactions evaluated: no  Clinically relevant drug interactions identified: no  Provided the patient with educational material regarding drug interactions: not applicable         Adverse Effects    *All other systems reviewed and are negative       Assessment Questions - Documented Responses      Flowsheet Row Most Recent Value   Assessment    Medication Reconciliation completed for patient No   During the past 4 weeks, has patient missed any activities due to condition or medication? No   During the past 4 weeks, did patient have any of the following urgent care visits? None   Goals of Therapy Status Discussed (new start)   Status of the patients ability to self-administer: Is Able   All education points have been covered with patient? No, patient declined- printed education provided   Welcome packet contents reviewed and discussed with patient? Yes   Assesment completed? Yes   Plan Therapy being initiated   Do you need to open a clinical intervention (i-vent)? No   Do you want to schedule first shipment? Yes   Medication #1 Assessment Info    Patient status New medication, New to [facility]   Is this medication appropriate for the patient? Yes   Is this medication effective? Not yet started          Refill Questions - Documented Responses      Flowsheet Row Most Recent Value   Patient Availability and HIPAA Verification    Does patient want to proceed with activity? Yes   HIPAA/medical authority confirmed? Yes   Relationship to patient of person spoken to? Family Member   Refill Screening Questions    When does the patient need to receive the medication? 12/20/22   Refill Delivery Questions    How will the patient receive the medication? MEDRx   When does the patient need to receive the medication? 12/20/22   Shipping Address Prescription   Address in [facility] confirmed and updated if neccessary? Yes   Expected Copay ($) 45   Is the patient able to afford the  "medication copay? Yes   Payment Method CC on file   Days supply of Refill 28   Supplies needed? No supplies needed   Refill activity completed? Yes   Refill activity plan Refill scheduled   Shipment/Pickup Date: 12/19/22            Objective    He has a past medical history of Aortic stenosis  (5/30/2013), Hep C w/o coma, chronic, History of non-ST elevation myocardial infarction (NSTEMI) (4/11/2013), HTN (hypertension) (2/25/2013), LGI bleed (4/11/2013), PVD (peripheral vascular disease) (2/25/2013), LUIS ALFREDO (renal artery stenosis) (4/11/2013), S/P BKA (below knee amputation) bilateral (2/25/2013), S/P CABG x 2 (2/25/2013), and Statin intolerance (8/28/2018).    Tried/failed medications:     BP Readings from Last 4 Encounters:   12/15/22 (!) 153/75   06/06/22 136/62   08/27/19 137/73   08/23/19 (!) 160/70     Ht Readings from Last 4 Encounters:   12/15/22 5' 6" (1.676 m)   12/06/22 5' 6" (1.676 m)   06/06/22 5' 6" (1.676 m)   08/27/19 5' 6" (1.676 m)     Wt Readings from Last 4 Encounters:   12/15/22 66.7 kg (147 lb 0.8 oz)   12/06/22 66.7 kg (147 lb)   06/06/22 67 kg (147 lb 11.3 oz)   08/27/19 69.9 kg (154 lb 1.6 oz)       The goals of prescribed drug therapy management include:  Supporting patient to meet the prescriber's medical treatment objectives  Improving or maintaining quality of life  Maintaining optimal therapy adherence  Minimizing and managing side effects      Goals of Therapy Status: Discussed (new start)    Assessment/Plan  Patient plans to start therapy on 12/20/22      Indication, dosage, appropriateness, effectiveness, safety and convenience of his specialty medication(s) were reviewed today.     Patient Education   Pharmacist offer to  patient was declined. Printed educational materials will be provided with medication.  Patient did accept verbal education on the following topics:  · Expectations and possible outcomes of therapy  · Proper use, timely administration, and missed dose " management  · Duration of therapy  · Side effects, including prevention, minimization, and management  · Storage, safe handling, and disposal      Tasks added this encounter   1/10/2023 - Refill Call (Auto Added)   Tasks due within next 3 months   No tasks due.     Stephanie Porras, PharmD  [facility]  [address] [place], [place] [address]  [facility] [address]  Phone: [phone]  Fax: [phone]

## 2022-12-16 NOTE — TELEPHONE ENCOUNTER
Kassy, this is Stephanie Porras with [facility].  We are working on your prescription that your doctor has sent us. We will be working with your insurance to get this approved for you. We will be calling you along the way with updates on your medication.  If you have any questions, you can reach us at [phone].    Welcome call outcome: Patient/caregiver reached    Permission to obtain Repatha yousif

## 2023-01-10 ENCOUNTER — SPECIALTY PHARMACY (OUTPATIENT)
Dept: PHARMACY | Facility: CLINIC | Age: 78
End: 2023-01-10
Payer: MEDICARE

## 2023-01-11 NOTE — TELEPHONE ENCOUNTER
Outgoing call regarding Repatha refill. Pts daughter stated she is not sure if he wants to refill, but she will call him to find out and will call us back.

## 2023-01-19 NOTE — TELEPHONE ENCOUNTER
Specialty Pharmacy - Refill Coordination    Specialty Medication Orders Linked to Encounter      Flowsheet Row Most Recent Value   Medication #1 evolocumab (REPATHA SURECLICK) 140 mg/mL PnIj (Order#[id number], Rx#[id number])            Refill Questions - Documented Responses      Flowsheet Row Most Recent Value   Patient Availability and HIPAA Verification    Does patient want to proceed with activity? Yes   HIPAA/medical authority confirmed? Yes   Relationship to patient of person spoken to? Child   Refill Screening Questions    Would patient like to speak to a pharmacist? No   When does the patient need to receive the medication? 01/24/23   Refill Delivery Questions    How will the patient receive the medication? MEDRx   When does the patient need to receive the medication? 01/24/23   Shipping Address Prescription   Address in [facility] confirmed and updated if neccessary? Yes   Expected Copay ($) 45   Is the patient able to afford the medication copay? Yes   Payment Method CC on file   Days supply of Refill 28   Supplies needed? No supplies needed   Refill activity completed? Yes   Refill activity plan Refill scheduled   Shipment/Pickup Date: 01/20/23            Current Outpatient Medications   Medication Sig    amLODIPine (NORVASC) 5 MG tablet TAKE ONE TABLET BY MOUTH IN THE EVENING    aspirin (ECOTRIN) 81 MG EC tablet Take 81 mg by mouth once daily.      carvediloL (COREG) 25 MG tablet TAKE ONE TABLET BY MOUTH TWICE DAILY.    cloNIDine (CATAPRES) 0.1 MG tablet TAKE 1 TABLET (0.1 MG TOTAL) BY MOUTH EVERY 6 (SIX) HOURS AS NEEDED. PRN SBP > 160    clopidogreL (PLAVIX) 75 mg tablet TAKE ONE TABLET BY MOUTH DAILY    evolocumab (REPATHA SURECLICK) 140 mg/mL PnIj Inject 1 mL (140 mg total) into the skin every 14 (fourteen) days.    losartan-hydrochlorothiazide 100-25 mg (HYZAAR) 100-25 mg per tablet TAKE ONE TABLET BY MOUTH DAILY    nitroGLYCERIN (NITROSTAT) 0.4 MG SL tablet Place 1 tablet (0.4 mg total) under  the tongue every 5 (five) minutes as needed.    pravastatin (PRAVACHOL) 20 MG tablet Take 1 tablet (20 mg total) by mouth every evening.   Last reviewed on 8/27/2019 10:44 AM by Vernon Kelley MD    Review of patient's allergies indicates:   Allergen Reactions    Crestor [rosuvastatin]      myalgia    Lipitor [atorvastatin]      myalgia    Liptruzet [ezetimibe-atorvastatin]     Lisinopril Rash    Last reviewed on  12/15/2022 1:32 PM by Tiffany Naranjo      Tasks added this encounter   2/14/2023 - Refill Call (Auto Added)   Tasks due within next 3 months   No tasks due.     [facility]  [address] [facility] [address]  Phone: [phone]  Fax: [phone]

## 2023-02-17 ENCOUNTER — PATIENT MESSAGE (OUTPATIENT)
Dept: PHARMACY | Facility: CLINIC | Age: 78
End: 2023-02-17
Payer: MEDICARE

## 2023-02-23 ENCOUNTER — SPECIALTY PHARMACY (OUTPATIENT)
Dept: PHARMACY | Facility: CLINIC | Age: 78
End: 2023-02-23
Payer: MEDICARE

## 2023-02-23 NOTE — TELEPHONE ENCOUNTER
Specialty Pharmacy - Patient Intervention     Patient education provided:  Drug therapy adherence: Drug administration     Impact on patient care:  Potentially improved therapy adherence     Additional Notes  Outgoing refill call for Repatha. Patient's daughter (mobile number) reported patient is hesitant to continue Repatha and believes it is giving him side effects. Daughter was unable to contact patient in order to confirm if patient experienced true adverse effects or was unwilling to continue therapy. Stated she will call [facility] back when she gets in contact with patient and agreed for follow up call next week if [facility] has not heard from her.

## 2023-03-07 NOTE — TELEPHONE ENCOUNTER
Specialty Pharmacy - Clinical Intervention  Specialty Pharmacy - Refill Coordination    Specialty Medication Orders Linked to Encounter      Flowsheet Row Most Recent Value   Medication #1 evolocumab (REPATHA SURECLICK) 140 mg/mL PnIj (Order#[id number], Rx#[id number])          Refill Questions - Documented Responses      Flowsheet Row Most Recent Value   Patient Availability and HIPAA Verification    Does patient want to proceed with activity? Unable to Reach   HIPAA/medical authority confirmed? Yes   Relationship to patient of person spoken to? Family Member          We have had multiple attempts to the patient and have been unsuccessful to reach the patient. We will stop reaching out to the patient but in the event that the patient needs the med and contacts us, we will communicate and begin dispensing for the patient. At your next visit with the patient, please review the importance of being in contact with our specialty pharmacy as a part of our care team.    Interventions added this encounter   Closed: [facility] Patient Intervention: evolocumab (REPATHA SURECLICK) 140 mg/mL PnIj  Closed: [facility] Provider Intervention - Drug therapy adherence: evolocumab (REPATHA SURECLICK) 140 mg/mL PnIj     Daniela Tafoya, PharmD  [facility]  [address] [facility] [address]  Phone: [phone]  Fax: [phone]

## 2023-03-20 ENCOUNTER — PES CALL (OUTPATIENT)
Dept: ADMINISTRATIVE | Facility: CLINIC | Age: 78
End: 2023-03-20
Payer: MEDICARE

## 2023-06-13 ENCOUNTER — PATIENT MESSAGE (OUTPATIENT)
Dept: CARDIOLOGY | Facility: CLINIC | Age: 78
End: 2023-06-13
Payer: MEDICARE

## 2023-07-17 ENCOUNTER — PATIENT MESSAGE (OUTPATIENT)
Dept: CARDIOLOGY | Facility: CLINIC | Age: 78
End: 2023-07-17
Payer: MEDICARE

## 2023-07-17 DIAGNOSIS — Z95.1 S/P CABG X 2: ICD-10-CM

## 2023-07-17 DIAGNOSIS — I10 ESSENTIAL HYPERTENSION: Chronic | ICD-10-CM

## 2023-07-17 DIAGNOSIS — I25.83 CORONARY ARTERY DISEASE DUE TO LIPID RICH PLAQUE: Chronic | ICD-10-CM

## 2023-07-17 DIAGNOSIS — Z72.0 TOBACCO ABUSE: Chronic | ICD-10-CM

## 2023-07-17 DIAGNOSIS — I25.10 CORONARY ARTERY DISEASE DUE TO LIPID RICH PLAQUE: Chronic | ICD-10-CM

## 2023-07-17 DIAGNOSIS — K92.2 LGI BLEED: ICD-10-CM

## 2023-07-17 DIAGNOSIS — I25.10 CORONARY ARTERY DISEASE INVOLVING NATIVE CORONARY ARTERY WITHOUT ANGINA PECTORIS, UNSPECIFIED WHETHER NATIVE OR TRANSPLANTED HEART: ICD-10-CM

## 2023-07-17 RX ORDER — LOSARTAN POTASSIUM AND HYDROCHLOROTHIAZIDE 25; 100 MG/1; MG/1
1 TABLET ORAL DAILY
Qty: 90 TABLET | Refills: 0 | Status: SHIPPED | OUTPATIENT
Start: 2023-07-17 | End: 2023-09-18 | Stop reason: SDUPTHER

## 2023-07-17 RX ORDER — CARVEDILOL 25 MG/1
25 TABLET ORAL 2 TIMES DAILY
Qty: 180 TABLET | Refills: 0 | Status: SHIPPED | OUTPATIENT
Start: 2023-07-17 | End: 2023-09-18 | Stop reason: SDUPTHER

## 2023-07-17 RX ORDER — CLONIDINE HYDROCHLORIDE 0.1 MG/1
0.1 TABLET ORAL EVERY 6 HOURS PRN
Qty: 60 TABLET | Refills: 1 | Status: SHIPPED | OUTPATIENT
Start: 2023-07-17 | End: 2023-09-18 | Stop reason: SDUPTHER

## 2023-07-17 RX ORDER — AMLODIPINE BESYLATE 5 MG/1
5 TABLET ORAL NIGHTLY
Qty: 90 TABLET | Refills: 0 | Status: SHIPPED | OUTPATIENT
Start: 2023-07-17 | End: 2023-09-18 | Stop reason: SDUPTHER

## 2023-09-12 ENCOUNTER — LAB VISIT (OUTPATIENT)
Dept: LAB | Facility: HOSPITAL | Age: 78
End: 2023-09-12
Attending: INTERNAL MEDICINE
Payer: MEDICARE

## 2023-09-12 DIAGNOSIS — E78.00 PURE HYPERCHOLESTEROLEMIA: Chronic | ICD-10-CM

## 2023-09-12 DIAGNOSIS — I25.10 CORONARY ARTERY DISEASE INVOLVING NATIVE CORONARY ARTERY OF NATIVE HEART WITHOUT ANGINA PECTORIS: Chronic | ICD-10-CM

## 2023-09-12 DIAGNOSIS — I10 PRIMARY HYPERTENSION: Chronic | ICD-10-CM

## 2023-09-12 LAB
ALBUMIN SERPL BCP-MCNC: 3.6 G/DL (ref 3.5–5.2)
ALP SERPL-CCNC: 67 U/L (ref 55–135)
ALT SERPL W/O P-5'-P-CCNC: 19 U/L (ref 10–44)
ANION GAP SERPL CALC-SCNC: 11 MMOL/L (ref 8–16)
AST SERPL-CCNC: 16 U/L (ref 10–40)
BILIRUB SERPL-MCNC: 0.6 MG/DL (ref 0.1–1)
BUN SERPL-MCNC: 43 MG/DL (ref 8–23)
CALCIUM SERPL-MCNC: 9 MG/DL (ref 8.7–10.5)
CHLORIDE SERPL-SCNC: 105 MMOL/L (ref 95–110)
CHOLEST SERPL-MCNC: 200 MG/DL (ref 120–199)
CHOLEST/HDLC SERPL: 7.7 {RATIO} (ref 2–5)
CO2 SERPL-SCNC: 23 MMOL/L (ref 23–29)
CREAT SERPL-MCNC: 2.3 MG/DL (ref 0.5–1.4)
EST. GFR  (NO RACE VARIABLE): 28.7 ML/MIN/1.73 M^2
GLUCOSE SERPL-MCNC: 95 MG/DL (ref 70–110)
HDLC SERPL-MCNC: 26 MG/DL (ref 40–75)
HDLC SERPL: 13 % (ref 20–50)
LDLC SERPL CALC-MCNC: 138 MG/DL (ref 63–159)
NONHDLC SERPL-MCNC: 174 MG/DL
POTASSIUM SERPL-SCNC: 5.3 MMOL/L (ref 3.5–5.1)
PROT SERPL-MCNC: 7.1 G/DL (ref 6–8.4)
SODIUM SERPL-SCNC: 139 MMOL/L (ref 136–145)
TRIGL SERPL-MCNC: 180 MG/DL (ref 30–150)

## 2023-09-12 PROCEDURE — 36415 COLL VENOUS BLD VENIPUNCTURE: CPT | Mod: PO | Performed by: INTERNAL MEDICINE

## 2023-09-12 PROCEDURE — 80053 COMPREHEN METABOLIC PANEL: CPT | Performed by: INTERNAL MEDICINE

## 2023-09-12 PROCEDURE — 80061 LIPID PANEL: CPT | Performed by: INTERNAL MEDICINE

## 2023-09-18 ENCOUNTER — OFFICE VISIT (OUTPATIENT)
Dept: CARDIOLOGY | Facility: CLINIC | Age: 78
End: 2023-09-18
Payer: MEDICARE

## 2023-09-18 VITALS
HEART RATE: 63 BPM | WEIGHT: 138.25 LBS | HEIGHT: 66 IN | DIASTOLIC BLOOD PRESSURE: 65 MMHG | SYSTOLIC BLOOD PRESSURE: 128 MMHG | BODY MASS INDEX: 22.22 KG/M2

## 2023-09-18 DIAGNOSIS — I25.10 CORONARY ARTERY DISEASE INVOLVING NATIVE CORONARY ARTERY OF NATIVE HEART WITHOUT ANGINA PECTORIS: Primary | Chronic | ICD-10-CM

## 2023-09-18 DIAGNOSIS — I08.0 MITRAL REGURGITATION AND AORTIC STENOSIS: Chronic | ICD-10-CM

## 2023-09-18 DIAGNOSIS — Z71.6 TOBACCO ABUSE COUNSELING: Chronic | ICD-10-CM

## 2023-09-18 DIAGNOSIS — I65.21 CAROTID STENOSIS, ASYMPTOMATIC, RIGHT: Chronic | ICD-10-CM

## 2023-09-18 DIAGNOSIS — I73.9 PVD (PERIPHERAL VASCULAR DISEASE): Chronic | ICD-10-CM

## 2023-09-18 DIAGNOSIS — I10 ESSENTIAL HYPERTENSION: Chronic | ICD-10-CM

## 2023-09-18 DIAGNOSIS — N18.4 STAGE 4 CHRONIC KIDNEY DISEASE: Chronic | ICD-10-CM

## 2023-09-18 DIAGNOSIS — Z95.1 S/P CABG X 2: Chronic | ICD-10-CM

## 2023-09-18 PROCEDURE — 99999 PR PBB SHADOW E&M-EST. PATIENT-LVL III: ICD-10-PCS | Mod: PBBFAC,,, | Performed by: INTERNAL MEDICINE

## 2023-09-18 PROCEDURE — 99214 OFFICE O/P EST MOD 30 MIN: CPT | Mod: S$GLB,,, | Performed by: INTERNAL MEDICINE

## 2023-09-18 PROCEDURE — 99214 PR OFFICE/OUTPT VISIT, EST, LEVL IV, 30-39 MIN: ICD-10-PCS | Mod: S$GLB,,, | Performed by: INTERNAL MEDICINE

## 2023-09-18 PROCEDURE — 3078F PR MOST RECENT DIASTOLIC BLOOD PRESSURE < 80 MM HG: ICD-10-PCS | Mod: CPTII,S$GLB,, | Performed by: INTERNAL MEDICINE

## 2023-09-18 PROCEDURE — 3074F SYST BP LT 130 MM HG: CPT | Mod: CPTII,S$GLB,, | Performed by: INTERNAL MEDICINE

## 2023-09-18 PROCEDURE — 3288F PR FALLS RISK ASSESSMENT DOCUMENTED: ICD-10-PCS | Mod: CPTII,S$GLB,, | Performed by: INTERNAL MEDICINE

## 2023-09-18 PROCEDURE — 1126F AMNT PAIN NOTED NONE PRSNT: CPT | Mod: CPTII,S$GLB,, | Performed by: INTERNAL MEDICINE

## 2023-09-18 PROCEDURE — 1101F PR PT FALLS ASSESS DOC 0-1 FALLS W/OUT INJ PAST YR: ICD-10-PCS | Mod: CPTII,S$GLB,, | Performed by: INTERNAL MEDICINE

## 2023-09-18 PROCEDURE — 1101F PT FALLS ASSESS-DOCD LE1/YR: CPT | Mod: CPTII,S$GLB,, | Performed by: INTERNAL MEDICINE

## 2023-09-18 PROCEDURE — 1159F MED LIST DOCD IN RCRD: CPT | Mod: CPTII,S$GLB,, | Performed by: INTERNAL MEDICINE

## 2023-09-18 PROCEDURE — 3074F PR MOST RECENT SYSTOLIC BLOOD PRESSURE < 130 MM HG: ICD-10-PCS | Mod: CPTII,S$GLB,, | Performed by: INTERNAL MEDICINE

## 2023-09-18 PROCEDURE — 3078F DIAST BP <80 MM HG: CPT | Mod: CPTII,S$GLB,, | Performed by: INTERNAL MEDICINE

## 2023-09-18 PROCEDURE — 99999 PR PBB SHADOW E&M-EST. PATIENT-LVL III: CPT | Mod: PBBFAC,,, | Performed by: INTERNAL MEDICINE

## 2023-09-18 PROCEDURE — 1159F PR MEDICATION LIST DOCUMENTED IN MEDICAL RECORD: ICD-10-PCS | Mod: CPTII,S$GLB,, | Performed by: INTERNAL MEDICINE

## 2023-09-18 PROCEDURE — 1126F PR PAIN SEVERITY QUANTIFIED, NO PAIN PRESENT: ICD-10-PCS | Mod: CPTII,S$GLB,, | Performed by: INTERNAL MEDICINE

## 2023-09-18 PROCEDURE — 3288F FALL RISK ASSESSMENT DOCD: CPT | Mod: CPTII,S$GLB,, | Performed by: INTERNAL MEDICINE

## 2023-09-18 RX ORDER — CLONIDINE HYDROCHLORIDE 0.1 MG/1
0.1 TABLET ORAL 2 TIMES DAILY PRN
Qty: 60 TABLET | Refills: 1 | Status: SHIPPED | OUTPATIENT
Start: 2023-09-18

## 2023-09-18 RX ORDER — AMLODIPINE BESYLATE 5 MG/1
5 TABLET ORAL NIGHTLY
Qty: 90 TABLET | Refills: 1 | Status: SHIPPED | OUTPATIENT
Start: 2023-09-18

## 2023-09-18 RX ORDER — CARVEDILOL 25 MG/1
25 TABLET ORAL 2 TIMES DAILY
Qty: 180 TABLET | Refills: 1 | Status: SHIPPED | OUTPATIENT
Start: 2023-09-18

## 2023-09-18 RX ORDER — LOSARTAN POTASSIUM AND HYDROCHLOROTHIAZIDE 25; 100 MG/1; MG/1
1 TABLET ORAL DAILY
Qty: 90 TABLET | Refills: 0 | Status: SHIPPED | OUTPATIENT
Start: 2023-09-18

## 2023-09-18 RX ORDER — PRAVASTATIN SODIUM 40 MG/1
40 TABLET ORAL NIGHTLY
Qty: 90 TABLET | Refills: 1 | Status: SHIPPED | OUTPATIENT
Start: 2023-09-18 | End: 2024-09-17

## 2023-09-18 NOTE — PROGRESS NOTES
Subjective:    Patient ID:  Evaristo Cardoso is a 77 y.o. male who presents for Follow-up, Coronary Artery Disease, and Heart Problem        HPI    DISCUSSED LABS AND GOALS, CMP WITH GFR OF 28,  DOWN FROM 161, HDL 26, TRIGLYCERIDE 180, DOING OK, CHRONIC PAIN IN THE LEGS FROM THE PROSTHESES, STILL SMOKES HEAVILY NOT WILLING TO QUIT, SEE ROS  Past Medical History:   Diagnosis Date    Aortic stenosis  5/30/2013 2017 Moderate to severe aortic stenosis, EJ = 0.96 cm2, peak velocity = 3.02 m/s, mean gradient = 21 mmHg.     Hep C w/o coma, chronic     History of non-ST elevation myocardial infarction (NSTEMI) 4/11/2013    3/1/13 of Diag cutting ptca     HTN (hypertension) 2/25/2013    LGI bleed 4/11/2013    3/13     PVD (peripheral vascular disease) 2/25/2013 1/13 rt EIA- occluded, left EIA 80%     LUIS ALFREDO (renal artery stenosis) 4/11/2013    3/13 40% bilateral LUIS ALFREDO     S/P BKA (below knee amputation) bilateral 2/25/2013    S/P CABG x 2 2/25/2013 1/13 VG-RCA, LIMA-LAD     Statin intolerance 8/28/2018     Past Surgical History:   Procedure Laterality Date    bka  1968    bilateral     Family History   Problem Relation Age of Onset    Breast cancer Daughter     Cancer Daughter     Stroke Father      Social History     Socioeconomic History    Marital status:    Tobacco Use    Smoking status: Every Day     Current packs/day: 0.25     Types: Cigarettes    Smokeless tobacco: Never       Review of patient's allergies indicates:   Allergen Reactions    Crestor [rosuvastatin]      myalgia    Lipitor [atorvastatin]      myalgia    Liptruzet [ezetimibe-atorvastatin]     Lisinopril Rash       Current Outpatient Medications:     aspirin (ECOTRIN) 81 MG EC tablet, Take 81 mg by mouth once daily.  , Disp: , Rfl:     clopidogreL (PLAVIX) 75 mg tablet, TAKE ONE TABLET BY MOUTH DAILY, Disp: 90 tablet, Rfl: 4    evolocumab (REPATHA SURECLICK) 140 mg/mL PnStephanie, Inject 1 mL (140 mg total) into the skin every 14 (fourteen)  days., Disp: 2 each, Rfl: 3    nitroGLYCERIN (NITROSTAT) 0.4 MG SL tablet, Place 1 tablet (0.4 mg total) under the tongue every 5 (five) minutes as needed., Disp: 25 tablet, Rfl: 6    amLODIPine (NORVASC) 5 MG tablet, Take 1 tablet (5 mg total) by mouth every evening., Disp: 90 tablet, Rfl: 1    carvediloL (COREG) 25 MG tablet, Take 1 tablet (25 mg total) by mouth 2 (two) times daily., Disp: 180 tablet, Rfl: 1    cloNIDine (CATAPRES) 0.1 MG tablet, Take 1 tablet (0.1 mg total) by mouth 2 (two) times daily as needed. Prn sbp > 160, Disp: 60 tablet, Rfl: 1    losartan-hydrochlorothiazide 100-25 mg (HYZAAR) 100-25 mg per tablet, Take 1 tablet by mouth once daily., Disp: 90 tablet, Rfl: 0    pravastatin (PRAVACHOL) 40 MG tablet, Take 1 tablet (40 mg total) by mouth every evening., Disp: 90 tablet, Rfl: 1    Review of Systems   Constitutional: Negative for chills, decreased appetite, diaphoresis and fever.   HENT:  Negative for congestion and sore throat.    Eyes:  Negative for blurred vision and redness.   Cardiovascular:  Negative for chest pain, claudication (BKA), cyanosis, dyspnea on exertion (MILD), irregular heartbeat, leg swelling and near-syncope.   Respiratory:  Negative for cough.    Endocrine: Negative.    Hematologic/Lymphatic: Negative for adenopathy. Does not bruise/bleed easily (SOME QUIT PLAVIX).   Skin:  Negative for color change and rash.   Musculoskeletal:  Positive for arthritis and joint pain. Negative for back pain and falls.   Gastrointestinal:  Negative for abdominal pain, dysphagia, jaundice and melena.   Genitourinary:  Negative for dysuria, flank pain and hematuria.   Neurological:  Negative for brief paralysis and focal weakness.   Psychiatric/Behavioral:  Negative for altered mental status and depression.    Allergic/Immunologic: Negative for hives and persistent infections.        Objective:      Vitals:    09/18/23 1340   BP: 128/65   Pulse: 63   Weight: 62.7 kg (138 lb 3.7 oz)   Height:  "5' 6" (1.676 m)   PainSc: 0-No pain     Body mass index is 22.31 kg/m².    Physical Exam            ..    Chemistry        Component Value Date/Time     09/12/2023 0940    K 5.3 (H) 09/12/2023 0940     09/12/2023 0940    CO2 23 09/12/2023 0940    BUN 43 (H) 09/12/2023 0940    CREATININE 2.3 (H) 09/12/2023 0940    GLU 95 09/12/2023 0940        Component Value Date/Time    CALCIUM 9.0 09/12/2023 0940    ALKPHOS 67 09/12/2023 0940    AST 16 09/12/2023 0940    ALT 19 09/12/2023 0940    BILITOT 0.6 09/12/2023 0940    ESTGFRAFRICA 29.4 (A) 06/06/2022 1501    EGFRNONAA 25.4 (A) 06/06/2022 1501            ..  Lab Results   Component Value Date    CHOL 200 (H) 09/12/2023    CHOL 225 (H) 06/06/2022    CHOL 220 (H) 08/23/2019     Lab Results   Component Value Date    HDL 26 (L) 09/12/2023    HDL 25 (L) 06/06/2022    HDL 26 (L) 08/23/2019     Lab Results   Component Value Date    LDLCALC 138.0 09/12/2023    LDLCALC 161.0 (H) 06/06/2022    LDLCALC 142.4 08/23/2019     Lab Results   Component Value Date    TRIG 180 (H) 09/12/2023    TRIG 195 (H) 06/06/2022    TRIG 258 (H) 08/23/2019     Lab Results   Component Value Date    CHOLHDL 13.0 (L) 09/12/2023    CHOLHDL 11.1 (L) 06/06/2022    CHOLHDL 11.8 (L) 08/23/2019     ..  Lab Results   Component Value Date    WBC 6.96 06/06/2022    HGB 12.7 (L) 06/06/2022    HCT 37.0 (L) 06/06/2022    MCV 90 06/06/2022     (L) 06/06/2022       Test(s) Reviewed  I have reviewed the following in detail:  [] Stress test   [] Angiography   [] Echocardiogram   [x] Labs   [] Other:       Assessment:         ICD-10-CM ICD-9-CM   1. Coronary artery disease involving native coronary artery of native heart without angina pectoris  I25.10 414.01   2. Tobacco abuse counseling  Z71.6 V65.42     305.1   3. Mitral regurgitation and aortic stenosis  I08.0 396.2   4. PVD (peripheral vascular disease)  I73.9 443.9   5. Stage 4 chronic kidney disease  N18.4 585.4   6. S/P CABG x 2  Z95.1 V45.81 "   7. Essential hypertension  I10 401.9   8. Carotid stenosis, asymptomatic, right  I65.21 433.10     Problem List Items Addressed This Visit          Cardiac/Vascular    PVD (peripheral vascular disease) (Chronic)    Overview     1/13 rt EIA- occluded, left EIA 80%         CAD (coronary artery disease) - Primary (Chronic)    Overview     1/13 Ld80%, cx 50%, rca 99%  3/1/13 LAD 85%, cx- 60%, ramus 60%, rca-80%, vg-rca and Lima-LAD patent         Relevant Orders    Comprehensive Metabolic Panel    Hemoglobin    S/P CABG x 2    Overview     1/13 VG-RCA, LIMA-LAD         Relevant Medications    amLODIPine (NORVASC) 5 MG tablet    cloNIDine (CATAPRES) 0.1 MG tablet    Essential hypertension    Relevant Medications    amLODIPine (NORVASC) 5 MG tablet    Mitral regurgitation and aortic stenosis    Overview     2017  Moderate to severe aortic stenosis, EJ = 0.96 cm2, peak velocity = 3.02 m/s, mean gradient = 21 mmHg.          Carotid stenosis, asymptomatic, right       Renal/    Stage 4 chronic kidney disease    Relevant Orders    Comprehensive Metabolic Panel    Hemoglobin       Other    Tobacco abuse counseling        Plan:     TOBACCO CESSATION, COUNSELING PATIENT STATES THAT HE HAD HIS ONLY ENJOYMENT IN LIFE, DECLINE NEPHROLOGY REFERRAL FOR SIGNIFICANT CKD, INCREASE PRAVACHOL TO 40 MG, PATIENT WAS NOT VERY COMPLIANT AND HESITANT TO CHANGE OR ADD ANYTHING ELSE, TARGET LDL LOWER,ALL CV CLINICALLY RELATIVELY STABLE, NO ANGINA, NO HF, NO TIA, NO CLINICAL ARRHYTHMIA,CONTINUE CURRENT MEDS, EDUCATION, DIET, EXERCISE AS MUCH AS POSSIBLE, RETURN TO CLINIC IN 8 MO WITH LABS, INCREASED CV RISK WITH SIGNIFICANT CKD, DISCUSSED PLAN WITH THE PATIENT AND HIS DAUGHTER      Coronary artery disease involving native coronary artery of native heart without angina pectoris  -     Comprehensive Metabolic Panel; Future; Expected date: 03/18/2024  -     Hemoglobin; Future; Expected date: 03/18/2024    Tobacco abuse counseling    Mitral  regurgitation and aortic stenosis    PVD (peripheral vascular disease)    Stage 4 chronic kidney disease  -     Comprehensive Metabolic Panel; Future; Expected date: 03/18/2024  -     Hemoglobin; Future; Expected date: 03/18/2024    S/P CABG x 2  -     amLODIPine (NORVASC) 5 MG tablet; Take 1 tablet (5 mg total) by mouth every evening.  Dispense: 90 tablet; Refill: 1  -     cloNIDine (CATAPRES) 0.1 MG tablet; Take 1 tablet (0.1 mg total) by mouth 2 (two) times daily as needed. Prn sbp > 160  Dispense: 60 tablet; Refill: 1    Essential hypertension  Comments:  CONTROLLED  Orders:  -     amLODIPine (NORVASC) 5 MG tablet; Take 1 tablet (5 mg total) by mouth every evening.  Dispense: 90 tablet; Refill: 1    Carotid stenosis, asymptomatic, right    Other orders  -     carvediloL (COREG) 25 MG tablet; Take 1 tablet (25 mg total) by mouth 2 (two) times daily.  Dispense: 180 tablet; Refill: 1  -     losartan-hydrochlorothiazide 100-25 mg (HYZAAR) 100-25 mg per tablet; Take 1 tablet by mouth once daily.  Dispense: 90 tablet; Refill: 0  -     pravastatin (PRAVACHOL) 40 MG tablet; Take 1 tablet (40 mg total) by mouth every evening.  Dispense: 90 tablet; Refill: 1    RTC Low level/low impact aerobic exercise 5x's/wk. Heart healthy diet and risk factor modification.    See labs and med orders.    Aerobic exercise 5x's/wk. Heart healthy diet and risk factor modification.    See labs and med orders.

## 2024-04-30 DIAGNOSIS — I10 ESSENTIAL HYPERTENSION: Chronic | ICD-10-CM

## 2024-04-30 DIAGNOSIS — Z95.1 S/P CABG X 2: Chronic | ICD-10-CM

## 2024-05-01 RX ORDER — CLONIDINE HYDROCHLORIDE 0.1 MG/1
TABLET ORAL
Qty: 60 TABLET | Refills: 0 | Status: SHIPPED | OUTPATIENT
Start: 2024-05-01 | End: 2024-05-20 | Stop reason: SDUPTHER

## 2024-05-01 RX ORDER — AMLODIPINE BESYLATE 5 MG/1
5 TABLET ORAL NIGHTLY
Qty: 90 TABLET | Refills: 0 | Status: SHIPPED | OUTPATIENT
Start: 2024-05-01 | End: 2024-05-20 | Stop reason: SDUPTHER

## 2024-05-01 RX ORDER — CARVEDILOL 25 MG/1
25 TABLET ORAL 2 TIMES DAILY
Qty: 180 TABLET | Refills: 0 | Status: SHIPPED | OUTPATIENT
Start: 2024-05-01 | End: 2024-05-20 | Stop reason: SDUPTHER

## 2024-05-15 DIAGNOSIS — I25.10 CORONARY ARTERY DISEASE INVOLVING NATIVE CORONARY ARTERY OF NATIVE HEART WITHOUT ANGINA PECTORIS: Primary | ICD-10-CM

## 2024-05-15 RX ORDER — LOSARTAN POTASSIUM AND HYDROCHLOROTHIAZIDE 25; 100 MG/1; MG/1
1 TABLET ORAL
Qty: 90 TABLET | Refills: 0 | Status: SHIPPED | OUTPATIENT
Start: 2024-05-15 | End: 2024-05-20 | Stop reason: SDUPTHER

## 2024-05-18 ENCOUNTER — LAB VISIT (OUTPATIENT)
Dept: LAB | Facility: HOSPITAL | Age: 79
End: 2024-05-18
Attending: INTERNAL MEDICINE
Payer: MEDICARE

## 2024-05-18 DIAGNOSIS — N18.4 STAGE 4 CHRONIC KIDNEY DISEASE: Chronic | ICD-10-CM

## 2024-05-18 DIAGNOSIS — I25.10 CORONARY ARTERY DISEASE INVOLVING NATIVE CORONARY ARTERY OF NATIVE HEART WITHOUT ANGINA PECTORIS: Chronic | ICD-10-CM

## 2024-05-18 LAB
ALBUMIN SERPL BCP-MCNC: 3.6 G/DL (ref 3.5–5.2)
ALP SERPL-CCNC: 74 U/L (ref 55–135)
ALT SERPL W/O P-5'-P-CCNC: 8 U/L (ref 10–44)
ANION GAP SERPL CALC-SCNC: 8 MMOL/L (ref 8–16)
AST SERPL-CCNC: 10 U/L (ref 10–40)
BILIRUB SERPL-MCNC: 0.5 MG/DL (ref 0.1–1)
BUN SERPL-MCNC: 43 MG/DL (ref 8–23)
CALCIUM SERPL-MCNC: 8.9 MG/DL (ref 8.7–10.5)
CHLORIDE SERPL-SCNC: 107 MMOL/L (ref 95–110)
CO2 SERPL-SCNC: 23 MMOL/L (ref 23–29)
CREAT SERPL-MCNC: 2.4 MG/DL (ref 0.5–1.4)
EST. GFR  (NO RACE VARIABLE): 27.1 ML/MIN/1.73 M^2
GLUCOSE SERPL-MCNC: 103 MG/DL (ref 70–110)
HGB BLD-MCNC: 11.7 G/DL (ref 14–18)
POTASSIUM SERPL-SCNC: 4.6 MMOL/L (ref 3.5–5.1)
PROT SERPL-MCNC: 7.2 G/DL (ref 6–8.4)
SODIUM SERPL-SCNC: 138 MMOL/L (ref 136–145)

## 2024-05-18 PROCEDURE — 36415 COLL VENOUS BLD VENIPUNCTURE: CPT | Mod: PO | Performed by: INTERNAL MEDICINE

## 2024-05-18 PROCEDURE — 80053 COMPREHEN METABOLIC PANEL: CPT | Performed by: INTERNAL MEDICINE

## 2024-05-18 PROCEDURE — 85018 HEMOGLOBIN: CPT | Performed by: INTERNAL MEDICINE

## 2024-05-20 ENCOUNTER — OFFICE VISIT (OUTPATIENT)
Dept: CARDIOLOGY | Facility: CLINIC | Age: 79
End: 2024-05-20
Payer: MEDICARE

## 2024-05-20 VITALS
BODY MASS INDEX: 23.31 KG/M2 | HEART RATE: 67 BPM | HEIGHT: 66 IN | SYSTOLIC BLOOD PRESSURE: 138 MMHG | DIASTOLIC BLOOD PRESSURE: 70 MMHG | WEIGHT: 145.06 LBS

## 2024-05-20 DIAGNOSIS — I25.10 CORONARY ARTERY DISEASE INVOLVING NATIVE CORONARY ARTERY OF NATIVE HEART WITHOUT ANGINA PECTORIS: Primary | Chronic | ICD-10-CM

## 2024-05-20 DIAGNOSIS — I08.0 MITRAL REGURGITATION AND AORTIC STENOSIS: ICD-10-CM

## 2024-05-20 DIAGNOSIS — E78.2 MIXED HYPERLIPIDEMIA: Chronic | ICD-10-CM

## 2024-05-20 DIAGNOSIS — Z95.1 S/P CABG X 2: Chronic | ICD-10-CM

## 2024-05-20 DIAGNOSIS — I65.21 CAROTID STENOSIS, ASYMPTOMATIC, RIGHT: Chronic | ICD-10-CM

## 2024-05-20 DIAGNOSIS — Z91.199 MEDICAL NON-COMPLIANCE: Chronic | ICD-10-CM

## 2024-05-20 DIAGNOSIS — Z79.02 LONG TERM (CURRENT) USE OF ANTITHROMBOTICS/ANTIPLATELETS: Chronic | ICD-10-CM

## 2024-05-20 DIAGNOSIS — N18.4 STAGE 4 CHRONIC KIDNEY DISEASE: Chronic | ICD-10-CM

## 2024-05-20 DIAGNOSIS — I10 ESSENTIAL HYPERTENSION: Chronic | ICD-10-CM

## 2024-05-20 DIAGNOSIS — I73.9 PVD (PERIPHERAL VASCULAR DISEASE): ICD-10-CM

## 2024-05-20 DIAGNOSIS — Z71.6 TOBACCO ABUSE COUNSELING: Chronic | ICD-10-CM

## 2024-05-20 PROCEDURE — 1159F MED LIST DOCD IN RCRD: CPT | Mod: CPTII,S$GLB,, | Performed by: INTERNAL MEDICINE

## 2024-05-20 PROCEDURE — 1126F AMNT PAIN NOTED NONE PRSNT: CPT | Mod: CPTII,S$GLB,, | Performed by: INTERNAL MEDICINE

## 2024-05-20 PROCEDURE — 99214 OFFICE O/P EST MOD 30 MIN: CPT | Mod: S$GLB,,, | Performed by: INTERNAL MEDICINE

## 2024-05-20 PROCEDURE — 3078F DIAST BP <80 MM HG: CPT | Mod: CPTII,S$GLB,, | Performed by: INTERNAL MEDICINE

## 2024-05-20 PROCEDURE — 3288F FALL RISK ASSESSMENT DOCD: CPT | Mod: CPTII,S$GLB,, | Performed by: INTERNAL MEDICINE

## 2024-05-20 PROCEDURE — 1101F PT FALLS ASSESS-DOCD LE1/YR: CPT | Mod: CPTII,S$GLB,, | Performed by: INTERNAL MEDICINE

## 2024-05-20 PROCEDURE — 99999 PR PBB SHADOW E&M-EST. PATIENT-LVL III: CPT | Mod: PBBFAC,,, | Performed by: INTERNAL MEDICINE

## 2024-05-20 PROCEDURE — 3075F SYST BP GE 130 - 139MM HG: CPT | Mod: CPTII,S$GLB,, | Performed by: INTERNAL MEDICINE

## 2024-05-20 RX ORDER — AMLODIPINE BESYLATE 5 MG/1
5 TABLET ORAL NIGHTLY
Qty: 90 TABLET | Refills: 1 | Status: SHIPPED | OUTPATIENT
Start: 2024-05-20

## 2024-05-20 RX ORDER — CARVEDILOL 25 MG/1
25 TABLET ORAL 2 TIMES DAILY
Qty: 180 TABLET | Refills: 1 | Status: SHIPPED | OUTPATIENT
Start: 2024-05-20

## 2024-05-20 RX ORDER — LOSARTAN POTASSIUM AND HYDROCHLOROTHIAZIDE 25; 100 MG/1; MG/1
1 TABLET ORAL DAILY
Qty: 90 TABLET | Refills: 1 | Status: SHIPPED | OUTPATIENT
Start: 2024-05-20

## 2024-05-20 RX ORDER — CLONIDINE HYDROCHLORIDE 0.1 MG/1
0.1 TABLET ORAL EVERY 12 HOURS PRN
Qty: 60 TABLET | Refills: 1 | Status: SHIPPED | OUTPATIENT
Start: 2024-05-20

## 2024-05-20 RX ORDER — CLOPIDOGREL BISULFATE 75 MG/1
75 TABLET ORAL DAILY
Qty: 90 TABLET | Refills: 1 | Status: SHIPPED | OUTPATIENT
Start: 2024-05-20

## 2024-05-20 NOTE — PROGRESS NOTES
Subjective:    Patient ID:  Evaristo Cardoso is a 78 y.o. male who presents for Follow-up, Results, and Medication Refill (nitrostat)        HPI     DISCUSSED LABS AND GOALS CMP WITH GFR OF 27, HEMOGLOBIN 11.7, DOING OK, REFUSES TO SEE NEPHROLOGY PER DAUGHTER, NOT COMPLAINT WITH MEDS, REFUSED REPATHA, DAUGHTER STATES THAT HE DOES NOT WANT TO GO SEE ANY PHYSICIAN AND SHE CAN BARELY DRAGS HIM HERE SEE ROS  Past Medical History:   Diagnosis Date    Aortic stenosis  5/30/2013 2017 Moderate to severe aortic stenosis, EJ = 0.96 cm2, peak velocity = 3.02 m/s, mean gradient = 21 mmHg.     Hep C w/o coma, chronic     History of non-ST elevation myocardial infarction (NSTEMI) 4/11/2013    3/1/13 of Diag cutting ptca     HTN (hypertension) 2/25/2013    LGI bleed 4/11/2013    3/13     PVD (peripheral vascular disease) 2/25/2013 1/13 rt EIA- occluded, left EIA 80%     LUIS ALFREDO (renal artery stenosis) 4/11/2013    3/13 40% bilateral LUIS ALFREDO     S/P BKA (below knee amputation) bilateral 2/25/2013    S/P CABG x 2 2/25/2013 1/13 VG-RCA, LIMA-LAD     Statin intolerance 8/28/2018     Past Surgical History:   Procedure Laterality Date    bka  1968    bilateral     Family History   Problem Relation Name Age of Onset    Breast cancer Daughter      Cancer Daughter      Stroke Father       Social History     Socioeconomic History    Marital status:    Tobacco Use    Smoking status: Every Day     Current packs/day: 0.25     Types: Cigarettes    Smokeless tobacco: Never       Review of patient's allergies indicates:   Allergen Reactions    Crestor [rosuvastatin]      myalgia    Lipitor [atorvastatin]      myalgia    Liptruzet [ezetimibe-atorvastatin]     Lisinopril Rash       Current Outpatient Medications:     aspirin (ECOTRIN) 81 MG EC tablet, Take 81 mg by mouth once daily.  , Disp: , Rfl:     evolocumab (REPATHA SURECLICK) 140 mg/mL PnStephanie, Inject 1 mL (140 mg total) into the skin every 14 (fourteen) days., Disp: 2 each, Rfl:  3    nitroGLYCERIN (NITROSTAT) 0.4 MG SL tablet, Place 1 tablet (0.4 mg total) under the tongue every 5 (five) minutes as needed., Disp: 25 tablet, Rfl: 6    pravastatin (PRAVACHOL) 40 MG tablet, Take 1 tablet (40 mg total) by mouth every evening., Disp: 90 tablet, Rfl: 1    amLODIPine (NORVASC) 5 MG tablet, Take 1 tablet (5 mg total) by mouth every evening., Disp: 90 tablet, Rfl: 1    carvediloL (COREG) 25 MG tablet, Take 1 tablet (25 mg total) by mouth 2 (two) times daily., Disp: 180 tablet, Rfl: 1    cloNIDine (CATAPRES) 0.1 MG tablet, Take 1 tablet (0.1 mg total) by mouth every 12 (twelve) hours as needed., Disp: 60 tablet, Rfl: 1    clopidogreL (PLAVIX) 75 mg tablet, Take 1 tablet (75 mg total) by mouth once daily., Disp: 90 tablet, Rfl: 1    losartan-hydrochlorothiazide 100-25 mg (HYZAAR) 100-25 mg per tablet, Take 1 tablet by mouth once daily., Disp: 90 tablet, Rfl: 1    Review of Systems   Constitutional: Negative for chills, decreased appetite, diaphoresis and fever.   HENT:  Negative for congestion and sore throat.    Eyes:  Negative for blurred vision and redness.   Cardiovascular:  Negative for chest pain, claudication (BKA), cyanosis, dyspnea on exertion (MILD), irregular heartbeat, leg swelling and near-syncope.   Respiratory:  Negative for cough.    Endocrine: Negative for polyphagia and polyuria.   Hematologic/Lymphatic: Negative for adenopathy. Does not bruise/bleed easily.   Skin:  Negative for color change and rash.   Musculoskeletal:  Positive for arthritis and joint pain. Negative for back pain and falls.   Gastrointestinal:  Negative for abdominal pain, dysphagia, jaundice and melena.   Genitourinary:  Negative for dysuria and flank pain.   Neurological:  Negative for brief paralysis and focal weakness.   Psychiatric/Behavioral:  Negative for altered mental status and depression.    Allergic/Immunologic: Negative for hives and persistent infections.        Objective:      Vitals:    05/20/24  "1332   BP: 138/70   Pulse: 67   Weight: 65.8 kg (145 lb 1 oz)   Height: 5' 6" (1.676 m)   PainSc: 0-No pain     Body mass index is 23.41 kg/m².    Physical Exam  Constitutional:       Appearance: Normal appearance.   Eyes:      Extraocular Movements: Extraocular movements intact.      Conjunctiva/sclera: Conjunctivae normal.   Neck:      Vascular: No carotid bruit or JVD.   Cardiovascular:      Rate and Rhythm: Normal rate and regular rhythm.      Pulses:           Carotid pulses are 2+ on the right side with bruit and 2+ on the left side.       Radial pulses are 2+ on the right side and 2+ on the left side.      Heart sounds: Murmur heard.      Systolic murmur is present with a grade of 2/6 at the upper right sternal border.      No friction rub. No gallop.      Comments: BKA  Pulmonary:      Effort: Prolonged expiration present. No respiratory distress.      Breath sounds: No rales.   Chest:       Abdominal:      General: There is no distension.      Palpations: Abdomen is soft.   Musculoskeletal:      Cervical back: Neck supple.      Comments: B AKA/PROSTHESIS   Skin:     General: Skin is warm and dry.      Capillary Refill: Capillary refill takes less than 2 seconds.   Neurological:      General: No focal deficit present.      Mental Status: He is alert and oriented to person, place, and time.      Cranial Nerves: Cranial nerve deficit (Hughes) present.   Psychiatric:         Mood and Affect: Mood normal.         Speech: Speech normal.         Behavior: Behavior normal.                 ..    Chemistry        Component Value Date/Time     05/18/2024 0922    K 4.6 05/18/2024 0922     05/18/2024 0922    CO2 23 05/18/2024 0922    BUN 43 (H) 05/18/2024 0922    CREATININE 2.4 (H) 05/18/2024 0922     05/18/2024 0922        Component Value Date/Time    CALCIUM 8.9 05/18/2024 0922    ALKPHOS 74 05/18/2024 0922    AST 10 05/18/2024 0922    ALT 8 (L) 05/18/2024 0922    BILITOT 0.5 05/18/2024 0922    " ESTGFRAFRICA 29.4 (A) 06/06/2022 1501    EGFRNONAA 25.4 (A) 06/06/2022 1501            ..  Lab Results   Component Value Date    CHOL 200 (H) 09/12/2023    CHOL 225 (H) 06/06/2022    CHOL 220 (H) 08/23/2019     Lab Results   Component Value Date    HDL 26 (L) 09/12/2023    HDL 25 (L) 06/06/2022    HDL 26 (L) 08/23/2019     Lab Results   Component Value Date    LDLCALC 138.0 09/12/2023    LDLCALC 161.0 (H) 06/06/2022    LDLCALC 142.4 08/23/2019     Lab Results   Component Value Date    TRIG 180 (H) 09/12/2023    TRIG 195 (H) 06/06/2022    TRIG 258 (H) 08/23/2019     Lab Results   Component Value Date    CHOLHDL 13.0 (L) 09/12/2023    CHOLHDL 11.1 (L) 06/06/2022    CHOLHDL 11.8 (L) 08/23/2019     ..  Lab Results   Component Value Date    WBC 6.96 06/06/2022    HGB 11.7 (L) 05/18/2024    HCT 37.0 (L) 06/06/2022    MCV 90 06/06/2022     (L) 06/06/2022       Test(s) Reviewed  I have reviewed the following in detail:  [] Stress test   [] Angiography   [] Echocardiogram   [x] Labs   [] Other:       Assessment:         ICD-10-CM ICD-9-CM   1. Coronary artery disease involving native coronary artery of native heart without angina pectoris  I25.10 414.01   2. Mitral regurgitation and aortic stenosis  I08.0 396.2   3. PVD (peripheral vascular disease)  I73.9 443.9   4. Carotid stenosis, asymptomatic, right  I65.21 433.10   5. Stage 4 chronic kidney disease  N18.4 585.4   6. Long term (current) use of antithrombotics/antiplatelets  Z79.02 V58.63   7. Tobacco abuse counseling  Z71.6 V65.42     305.1   8. S/P CABG x 2  Z95.1 V45.81   9. Essential hypertension  I10 401.9   10. Essential hypertension  I10 401.9   11. Mixed hyperlipidemia  E78.2 272.2   12. Medical non-compliance  Z91.199 V15.81     Problem List Items Addressed This Visit          Cardiac/Vascular    PVD (peripheral vascular disease) (Chronic)    Overview     1/13 rt EIA- occluded, left EIA 80%         CAD (coronary artery disease) - Primary (Chronic)     Overview     1/13 Ld80%, cx 50%, rca 99%  3/1/13 LAD 85%, cx- 60%, ramus 60%, rca-80%, vg-rca and Lima-LAD patent         Relevant Medications    losartan-hydrochlorothiazide 100-25 mg (HYZAAR) 100-25 mg per tablet    Other Relevant Orders    Comprehensive Metabolic Panel    Lipid Panel    Hyperlipidemia (Chronic)    Relevant Medications    clopidogreL (PLAVIX) 75 mg tablet    S/P CABG x 2    Overview     1/13 VG-RCA, LIMA-LAD         Relevant Medications    amLODIPine (NORVASC) 5 MG tablet    clopidogreL (PLAVIX) 75 mg tablet    cloNIDine (CATAPRES) 0.1 MG tablet    Essential hypertension    Relevant Medications    amLODIPine (NORVASC) 5 MG tablet    clopidogreL (PLAVIX) 75 mg tablet    Mitral regurgitation and aortic stenosis    Overview     2017  Moderate to severe aortic stenosis, EJ = 0.96 cm2, peak velocity = 3.02 m/s, mean gradient = 21 mmHg.          Relevant Orders    Echo    Carotid stenosis, asymptomatic, right       Renal/    Stage 4 chronic kidney disease    Relevant Orders    Comprehensive Metabolic Panel       Hematology    Long term (current) use of antithrombotics/antiplatelets    Relevant Orders    Hemoglobin       Palliative Care    Medical non-compliance       Other    Tobacco abuse counseling        Plan:         COMPLIANCE, VERY LONG DISCUSSION WITH THE PATIENT AND HIS DAUGHTER TOBACCO CESSATION COUNSELING, CHECK ECHO, CLINICALLY DAILY WEIGHT AND RELATIVELY OKAY NO ANGINA NO OVERT HEART FAILURE NO CLINICAL ARRHYTHMIA WALKING EXERCISE AS MUCH AS POSSIBLE VERY HIGH-RISK WITH DIFFUSE GENERALIZED ATHERO SCLEROSIS AND STAGE 4 KIDNEY DISEASE, DISCUSSED WITH THE PATIENT AND HIS DAUGHTER, RETURN TO CLINIC IN 6 MO WITH LABS  Coronary artery disease involving native coronary artery of native heart without angina pectoris  -     losartan-hydrochlorothiazide 100-25 mg (HYZAAR) 100-25 mg per tablet; Take 1 tablet by mouth once daily.  Dispense: 90 tablet; Refill: 1  -     Comprehensive Metabolic Panel;  Future; Expected date: 11/20/2024  -     Lipid Panel; Future; Expected date: 11/20/2024    Mitral regurgitation and aortic stenosis  -     Echo    PVD (peripheral vascular disease)    Carotid stenosis, asymptomatic, right    Stage 4 chronic kidney disease  -     Comprehensive Metabolic Panel; Future; Expected date: 11/20/2024    Long term (current) use of antithrombotics/antiplatelets  -     Hemoglobin; Future; Expected date: 11/20/2024    Tobacco abuse counseling    S/P CABG x 2  -     amLODIPine (NORVASC) 5 MG tablet; Take 1 tablet (5 mg total) by mouth every evening.  Dispense: 90 tablet; Refill: 1  -     clopidogreL (PLAVIX) 75 mg tablet; Take 1 tablet (75 mg total) by mouth once daily.  Dispense: 90 tablet; Refill: 1  -     cloNIDine (CATAPRES) 0.1 MG tablet; Take 1 tablet (0.1 mg total) by mouth every 12 (twelve) hours as needed.  Dispense: 60 tablet; Refill: 1    Essential hypertension  Comments:  CONTROLLED  Orders:  -     amLODIPine (NORVASC) 5 MG tablet; Take 1 tablet (5 mg total) by mouth every evening.  Dispense: 90 tablet; Refill: 1  -     clopidogreL (PLAVIX) 75 mg tablet; Take 1 tablet (75 mg total) by mouth once daily.  Dispense: 90 tablet; Refill: 1    Essential hypertension  -     amLODIPine (NORVASC) 5 MG tablet; Take 1 tablet (5 mg total) by mouth every evening.  Dispense: 90 tablet; Refill: 1  -     clopidogreL (PLAVIX) 75 mg tablet; Take 1 tablet (75 mg total) by mouth once daily.  Dispense: 90 tablet; Refill: 1    Mixed hyperlipidemia  -     clopidogreL (PLAVIX) 75 mg tablet; Take 1 tablet (75 mg total) by mouth once daily.  Dispense: 90 tablet; Refill: 1    Medical non-compliance    Other orders  -     carvediloL (COREG) 25 MG tablet; Take 1 tablet (25 mg total) by mouth 2 (two) times daily.  Dispense: 180 tablet; Refill: 1    RTC Low level/low impact aerobic exercise 5x's/wk. Heart healthy diet and risk factor modification.    See labs and med orders.    Aerobic exercise 5x's/wk. Heart  healthy diet and risk factor modification.    See labs and med orders.

## 2024-05-21 PROBLEM — Z91.199 MEDICAL NON-COMPLIANCE: Status: ACTIVE | Noted: 2024-05-21

## 2024-06-11 ENCOUNTER — HOSPITAL ENCOUNTER (OUTPATIENT)
Dept: CARDIOLOGY | Facility: HOSPITAL | Age: 79
Discharge: HOME | End: 2024-06-11
Attending: INTERNAL MEDICINE
Payer: MEDICARE

## 2024-06-11 VITALS — HEIGHT: 66 IN | WEIGHT: 145 LBS | BODY MASS INDEX: 23.3 KG/M2

## 2024-06-11 LAB
ASCENDING AORTA: 3.95 CM
AV INDEX (PROSTH): 0.24
AV MEAN GRADIENT: 23 MMHG
AV PEAK GRADIENT: 37 MMHG
AV REGURGITATION PRESSURE HALF TIME: 608.59 MS
AV VALVE AREA BY VELOCITY RATIO: 0.92 CM²
AV VALVE AREA: 0.98 CM²
AV VELOCITY RATIO: 0.23
BSA FOR ECHO PROCEDURE: 1.75 M2
CV ECHO LV RWT: 0.55 CM
DOP CALC AO PEAK VEL: 3.05 M/S
DOP CALC AO VTI: 86.6 CM
DOP CALC LVOT AREA: 4 CM2
DOP CALC LVOT DIAMETER: 2.26 CM
DOP CALC LVOT PEAK VEL: 0.7 M/S
DOP CALC LVOT STROKE VOLUME: 84.6 CM3
DOP CALCLVOT PEAK VEL VTI: 21.1 CM
E WAVE DECELERATION TIME: 309.83 MSEC
E/A RATIO: 1.4
E/E' RATIO: 21.8 M/S
ECHO LV POSTERIOR WALL: 1.2 CM (ref 0.6–1.1)
EJECTION FRACTION: 50 %
FRACTIONAL SHORTENING: 25 % (ref 28–44)
INTERVENTRICULAR SEPTUM: 1.56 CM (ref 0.6–1.1)
IVRT: 134.16 MSEC
LEFT ATRIUM SIZE: 4.25 CM
LEFT ATRIUM VOLUME INDEX MOD: 34.8 ML/M2
LEFT ATRIUM VOLUME MOD: 60.53 CM3
LEFT INTERNAL DIMENSION IN SYSTOLE: 3.3 CM (ref 2.1–4)
LEFT VENTRICLE DIASTOLIC VOLUME INDEX: 50.18 ML/M2
LEFT VENTRICLE DIASTOLIC VOLUME: 87.32 ML
LEFT VENTRICLE MASS INDEX: 135 G/M2
LEFT VENTRICLE SYSTOLIC VOLUME INDEX: 26.8 ML/M2
LEFT VENTRICLE SYSTOLIC VOLUME: 46.62 ML
LEFT VENTRICULAR INTERNAL DIMENSION IN DIASTOLE: 4.39 CM (ref 3.5–6)
LEFT VENTRICULAR MASS: 234.33 G
LV LATERAL E/E' RATIO: 18.17 M/S
LV SEPTAL E/E' RATIO: 27.25 M/S
LVOT MG: 0.92 MMHG
LVOT MV: 0.45 CM/S
MV PEAK A VEL: 0.78 M/S
MV PEAK E VEL: 1.09 M/S
MV STENOSIS PRESSURE HALF TIME: 89.85 MS
MV VALVE AREA P 1/2 METHOD: 2.45 CM2
OHS CV RV/LV RATIO: 0.97 CM
PISA AR MAX VEL: 3.81 M/S
PISA TR MAX VEL: 2.82 M/S
PULM VEIN S/D RATIO: 1.19
PV PEAK D VEL: 0.37 M/S
PV PEAK S VEL: 0.44 M/S
RA PRESSURE ESTIMATED: 8 MMHG
RIGHT VENTRICULAR END-DIASTOLIC DIMENSION: 4.28 CM
RIGHT VENTRICULAR LENGTH IN DIASTOLE (APICAL 4-CHAMBER VIEW): 5.13 CM
RV MID DIAMA: 2.52 CM
RV TB RVSP: 11 MMHG
RV TISSUE DOPPLER FREE WALL SYSTOLIC VELOCITY 1 (APICAL 4 CHAMBER VIEW): 8.32 CM/S
SINUS: 3.82 CM
STJ: 3.45 CM
TDI LATERAL: 0.06 M/S
TDI SEPTAL: 0.04 M/S
TDI: 0.05 M/S
TR MAX PG: 32 MMHG
TV REST PULMONARY ARTERY PRESSURE: 40 MMHG
Z-SCORE OF LEFT VENTRICULAR DIMENSION IN END DIASTOLE: -0.93
Z-SCORE OF LEFT VENTRICULAR DIMENSION IN END SYSTOLE: 0.8

## 2024-06-11 PROCEDURE — 93306 TTE W/DOPPLER COMPLETE: CPT | Mod: PO

## 2024-06-11 PROCEDURE — 93306 TTE W/DOPPLER COMPLETE: CPT | Mod: 26,,, | Performed by: INTERNAL MEDICINE

## 2024-12-09 DIAGNOSIS — Z95.1 S/P CABG X 2: Chronic | ICD-10-CM

## 2024-12-09 DIAGNOSIS — E78.2 MIXED HYPERLIPIDEMIA: Chronic | ICD-10-CM

## 2024-12-09 DIAGNOSIS — I10 ESSENTIAL HYPERTENSION: Chronic | ICD-10-CM

## 2024-12-09 RX ORDER — CLOPIDOGREL BISULFATE 75 MG/1
75 TABLET ORAL
Qty: 90 TABLET | Refills: 1 | Status: SHIPPED | OUTPATIENT
Start: 2024-12-09

## 2025-01-02 ENCOUNTER — PATIENT MESSAGE (OUTPATIENT)
Dept: CARDIOLOGY | Facility: CLINIC | Age: 80
End: 2025-01-02
Payer: MEDICARE

## 2025-01-08 PROBLEM — D62 ACUTE BLOOD LOSS ANEMIA: Status: RESOLVED | Noted: 2025-01-08 | Resolved: 2025-01-08

## 2025-01-08 PROBLEM — N17.9 AKI (ACUTE KIDNEY INJURY): Status: ACTIVE | Noted: 2025-01-08

## 2025-01-08 PROBLEM — R06.09 DOE (DYSPNEA ON EXERTION): Status: ACTIVE | Noted: 2025-01-08

## 2025-01-08 PROBLEM — D64.89 OTHER SPECIFIED ANEMIAS: Status: ACTIVE | Noted: 2025-01-08

## 2025-01-08 PROBLEM — D62 ACUTE BLOOD LOSS ANEMIA: Status: ACTIVE | Noted: 2025-01-08

## 2025-01-09 PROBLEM — J44.1 COPD EXACERBATION: Status: ACTIVE | Noted: 2025-01-09

## 2025-01-10 PROBLEM — N28.9 KIDNEY LESION, NATIVE, LEFT: Status: ACTIVE | Noted: 2025-01-10

## 2025-01-11 PROBLEM — I35.0 MODERATE AORTIC STENOSIS: Status: ACTIVE | Noted: 2025-01-11

## 2025-01-12 PROBLEM — J90 BILATERAL PLEURAL EFFUSION: Status: ACTIVE | Noted: 2025-01-12

## 2025-01-12 PROBLEM — D69.6 THROMBOCYTOPENIA: Status: ACTIVE | Noted: 2025-01-12

## 2025-01-12 PROBLEM — N18.32 STAGE 3B CHRONIC KIDNEY DISEASE: Status: ACTIVE | Noted: 2023-09-18

## 2025-01-12 PROBLEM — K59.00 CONSTIPATION: Status: ACTIVE | Noted: 2025-01-12

## 2025-01-12 PROBLEM — I72.3 ANEURYSM OF COMMON ILIAC ARTERY: Status: ACTIVE | Noted: 2025-01-12

## 2025-01-12 PROBLEM — I71.43 INFRARENAL ABDOMINAL AORTIC ANEURYSM (AAA) WITHOUT RUPTURE: Status: ACTIVE | Noted: 2025-01-12

## 2025-01-12 PROBLEM — K74.69 OTHER CIRRHOSIS OF LIVER: Status: ACTIVE | Noted: 2025-01-12

## 2025-01-12 PROBLEM — I50.31 ACUTE DIASTOLIC HEART FAILURE: Status: ACTIVE | Noted: 2025-01-12

## 2025-01-13 PROBLEM — I50.9 ACUTE ON CHRONIC HEART FAILURE: Status: ACTIVE | Noted: 2025-01-13

## 2025-01-27 ENCOUNTER — TELEPHONE (OUTPATIENT)
Dept: FAMILY MEDICINE | Facility: CLINIC | Age: 80
End: 2025-01-27
Payer: MEDICARE

## 2025-01-27 PROBLEM — J44.9 COPD (CHRONIC OBSTRUCTIVE PULMONARY DISEASE): Status: ACTIVE | Noted: 2025-01-09

## 2025-01-27 PROBLEM — Z71.89 ACP (ADVANCE CARE PLANNING): Status: ACTIVE | Noted: 2025-01-27

## 2025-01-28 PROBLEM — Z51.5 ENCOUNTER FOR PALLIATIVE CARE: Status: ACTIVE | Noted: 2025-01-28

## 2025-01-28 PROBLEM — D64.9 SYMPTOMATIC ANEMIA: Status: ACTIVE | Noted: 2025-01-28

## 2025-01-31 PROBLEM — Z74.09 OTHER REDUCED MOBILITY: Status: ACTIVE | Noted: 2025-01-31

## 2025-02-02 PROBLEM — J40 BRONCHITIS: Status: ACTIVE | Noted: 2025-02-02

## 2025-02-06 PROBLEM — N28.1 COMPLEX RENAL CYST: Status: ACTIVE | Noted: 2025-02-06

## 2025-02-06 PROBLEM — N13.8 BPH WITH URINARY OBSTRUCTION: Status: ACTIVE | Noted: 2025-02-06

## 2025-02-06 PROBLEM — R33.9 URINARY RETENTION: Status: ACTIVE | Noted: 2025-02-06

## 2025-02-06 PROBLEM — N40.1 BPH WITH URINARY OBSTRUCTION: Status: ACTIVE | Noted: 2025-02-06

## 2025-02-10 ENCOUNTER — TELEPHONE (OUTPATIENT)
Dept: NEPHROLOGY | Facility: CLINIC | Age: 80
End: 2025-02-10
Payer: MEDICARE

## 2025-02-10 NOTE — TELEPHONE ENCOUNTER
----- Message from Ronaldo Browne MD sent at 2/9/2025  4:13 PM CST -----  Regarding: hospital discharge follow up  Valentino streeter,    I need this patient on my [facility] clinic in 3 weeks. Can offer/do telemedicine if one of his family members will participate with him.     Thanks,  ATR

## 2025-02-10 NOTE — TELEPHONE ENCOUNTER
Attempted to call patient to schedule appointment per MD. Unable to reach them at this time. Left voicemail.

## 2025-02-11 NOTE — TELEPHONE ENCOUNTER
Attempted to contact patient to schedule an appointment per MD. Unable to reach patient at this time. Was able to reach patient's daughter, Stephanie. [facility] follow up appointment made for 02/19/25 at 1320.

## 2025-02-14 ENCOUNTER — TELEPHONE (OUTPATIENT)
Dept: NEPHROLOGY | Facility: CLINIC | Age: 80
End: 2025-02-14
Payer: MEDICARE

## 2025-02-14 PROBLEM — K59.03 DRUG-INDUCED CONSTIPATION: Status: ACTIVE | Noted: 2025-02-14

## 2025-02-14 PROBLEM — K74.60 LIVER CIRRHOSIS SECONDARY TO NASH (NONALCOHOLIC STEATOHEPATITIS): Status: ACTIVE | Noted: 2025-01-12

## 2025-02-14 PROBLEM — K75.81 LIVER CIRRHOSIS SECONDARY TO NASH (NONALCOHOLIC STEATOHEPATITIS): Status: ACTIVE | Noted: 2025-01-12

## 2025-02-14 NOTE — TELEPHONE ENCOUNTER
----- Message from Brielle sent at 2/14/2025 11:25 AM CST -----   Type:  Needs Medical Advice    Who Called:  PT daughter Stephanie    Would the patient rather a call back or a response via [facility]?  call  Best Call Back Number: [phone]        Additional Information:  want to ask advise about appt 2/19/25..  Please call back to advise. Thank you!

## 2025-02-14 NOTE — TELEPHONE ENCOUNTER
"Called and spoke to patient's daughter, Stephanie. She states," They scheduled dad at the [facility] in [place]. Now, is Dr. Contreras going to be following his care."     Hospital follow up appointment with Dr. Browne cancelled. Informed her that the [facility] don't round at dialysis in [place]. Advised her to contact the hospital  for further information and options. She verbalized understanding.   "

## 2025-02-15 PROBLEM — S31.000A SACRAL WOUND, INITIAL ENCOUNTER: Status: ACTIVE | Noted: 2025-02-15

## 2025-02-17 PROBLEM — I50.33 ACUTE ON CHRONIC DIASTOLIC HEART FAILURE: Status: ACTIVE | Noted: 2025-01-13

## 2025-02-17 PROBLEM — I50.33 ACUTE ON CHRONIC HEART FAILURE WITH PRESERVED EJECTION FRACTION (HFPEF): Status: ACTIVE | Noted: 2025-01-12

## 2025-02-18 ENCOUNTER — TELEPHONE (OUTPATIENT)
Dept: UROLOGY | Facility: CLINIC | Age: 80
End: 2025-02-18
Payer: MEDICARE

## 2025-02-18 PROBLEM — R54 AGE-RELATED PHYSICAL DEBILITY: Status: ACTIVE | Noted: 2025-02-18

## 2025-02-18 NOTE — TELEPHONE ENCOUNTER
----- Message from Adalberto sent at 2/18/2025 12:06 PM CST -----  Contact: [facility]  Type:  Sooner Appointment RequestCaller is requesting a sooner appointment.  Caller declined first available appointment listed below.  Caller will not accept being placed on the waitlist and is requesting a message be sent to doctor.Name of Caller: STPHWhen is the first available appointment?  N/aSymptoms:  Hos f/u  PT is avail on Tues or Thurs because has dialysis M-W-FWould the patient rather a call back or a response via [facility]? callBest Call Back Number:  [phone] Additional Information:  PT is being discharged today needs to be seen in two weeks.

## 2025-02-20 ENCOUNTER — TELEPHONE (OUTPATIENT)
Dept: FAMILY MEDICINE | Facility: CLINIC | Age: 80
End: 2025-02-20
Payer: MEDICARE

## 2025-02-20 NOTE — TELEPHONE ENCOUNTER
Spoke with the patient's daughter she states that she scheduled her dad a hospital follow up and is wanting him to establish care with you as well. Informed her that you usually take your new patient's first thing in the morning. Informed her I will check with you to see if we can keep his hospital follow up and make an appointment at that appointment to establish care. Please advise.

## 2025-02-20 NOTE — TELEPHONE ENCOUNTER
----- Message from Brielle sent at 2/20/2025 10:07 AM CST -----   Type:  Needs Medical AdviceWho Called:  PTWould the patient rather a call back or a response via [facility]?  callBest Call Back Number: [phone] Information:  want someone to call because he est/care and has appt 2/25/25 want to make sure it will not be canceled..  Please call back to advise. Thank you!

## 2025-02-21 ENCOUNTER — TELEPHONE (OUTPATIENT)
Dept: HEMATOLOGY/ONCOLOGY | Facility: CLINIC | Age: 80
End: 2025-02-21
Payer: MEDICARE

## 2025-02-25 ENCOUNTER — OFFICE VISIT (OUTPATIENT)
Dept: FAMILY MEDICINE | Facility: CLINIC | Age: 80
End: 2025-02-25
Payer: MEDICARE

## 2025-02-25 VITALS
WEIGHT: 140 LBS | SYSTOLIC BLOOD PRESSURE: 136 MMHG | HEIGHT: 66 IN | OXYGEN SATURATION: 96 % | BODY MASS INDEX: 22.5 KG/M2 | DIASTOLIC BLOOD PRESSURE: 76 MMHG | HEART RATE: 91 BPM

## 2025-02-25 DIAGNOSIS — M79.605 CHRONIC PAIN OF BOTH LOWER EXTREMITIES: ICD-10-CM

## 2025-02-25 DIAGNOSIS — I50.31 ACUTE HEART FAILURE WITH PRESERVED EJECTION FRACTION (HFPEF): ICD-10-CM

## 2025-02-25 DIAGNOSIS — J44.9 CHRONIC OBSTRUCTIVE PULMONARY DISEASE, UNSPECIFIED COPD TYPE: ICD-10-CM

## 2025-02-25 DIAGNOSIS — N17.9 AKI (ACUTE KIDNEY INJURY): ICD-10-CM

## 2025-02-25 DIAGNOSIS — G89.29 CHRONIC PAIN OF BOTH LOWER EXTREMITIES: ICD-10-CM

## 2025-02-25 DIAGNOSIS — I50.32 CHRONIC HEART FAILURE WITH PRESERVED EJECTION FRACTION: Primary | ICD-10-CM

## 2025-02-25 DIAGNOSIS — F41.9 ANXIETY: ICD-10-CM

## 2025-02-25 DIAGNOSIS — M79.604 CHRONIC PAIN OF BOTH LOWER EXTREMITIES: ICD-10-CM

## 2025-02-25 PROBLEM — I50.30 HEART FAILURE WITH PRESERVED EJECTION FRACTION: Status: ACTIVE | Noted: 2025-02-25

## 2025-02-25 PROCEDURE — 99999 PR PBB SHADOW E&M-EST. PATIENT-LVL III: CPT | Mod: PBBFAC,,, | Performed by: INTERNAL MEDICINE

## 2025-02-25 PROCEDURE — 3075F SYST BP GE 130 - 139MM HG: CPT | Mod: CPTII,S$GLB,, | Performed by: INTERNAL MEDICINE

## 2025-02-25 PROCEDURE — 3288F FALL RISK ASSESSMENT DOCD: CPT | Mod: CPTII,S$GLB,, | Performed by: INTERNAL MEDICINE

## 2025-02-25 PROCEDURE — 1159F MED LIST DOCD IN RCRD: CPT | Mod: CPTII,S$GLB,, | Performed by: INTERNAL MEDICINE

## 2025-02-25 PROCEDURE — 1101F PT FALLS ASSESS-DOCD LE1/YR: CPT | Mod: CPTII,S$GLB,, | Performed by: INTERNAL MEDICINE

## 2025-02-25 PROCEDURE — 99204 OFFICE O/P NEW MOD 45 MIN: CPT | Mod: S$GLB,,, | Performed by: INTERNAL MEDICINE

## 2025-02-25 PROCEDURE — 1111F DSCHRG MED/CURRENT MED MERGE: CPT | Mod: CPTII,S$GLB,, | Performed by: INTERNAL MEDICINE

## 2025-02-25 PROCEDURE — 1160F RVW MEDS BY RX/DR IN RCRD: CPT | Mod: CPTII,S$GLB,, | Performed by: INTERNAL MEDICINE

## 2025-02-25 PROCEDURE — G2211 COMPLEX E/M VISIT ADD ON: HCPCS | Mod: S$GLB,,, | Performed by: INTERNAL MEDICINE

## 2025-02-25 PROCEDURE — 1125F AMNT PAIN NOTED PAIN PRSNT: CPT | Mod: CPTII,S$GLB,, | Performed by: INTERNAL MEDICINE

## 2025-02-25 PROCEDURE — 3078F DIAST BP <80 MM HG: CPT | Mod: CPTII,S$GLB,, | Performed by: INTERNAL MEDICINE

## 2025-02-25 RX ORDER — HYDROCODONE BITARTRATE AND ACETAMINOPHEN 10; 325 MG/1; MG/1
1 TABLET ORAL EVERY 12 HOURS PRN
Qty: 60 TABLET | Refills: 0 | Status: SHIPPED | OUTPATIENT
Start: 2025-02-25 | End: 2025-02-26 | Stop reason: SDUPTHER

## 2025-02-25 RX ORDER — NALOXONE HYDROCHLORIDE 4 MG/.1ML
1 SPRAY NASAL ONCE
Qty: 1 EACH | Refills: 0 | Status: SHIPPED | OUTPATIENT
Start: 2025-02-25 | End: 2025-02-25

## 2025-02-25 RX ORDER — PANTOPRAZOLE SODIUM 40 MG/1
40 TABLET, DELAYED RELEASE ORAL DAILY
Qty: 90 TABLET | Refills: 3 | Status: SHIPPED | OUTPATIENT
Start: 2025-02-25 | End: 2026-02-25

## 2025-02-25 RX ORDER — LORAZEPAM 0.5 MG/1
0.5 TABLET ORAL EVERY 12 HOURS PRN
Qty: 60 TABLET | Refills: 0 | Status: SHIPPED | OUTPATIENT
Start: 2025-02-25 | End: 2025-03-27

## 2025-02-25 RX ORDER — GABAPENTIN 100 MG/1
100 CAPSULE ORAL NIGHTLY
Qty: 90 CAPSULE | Refills: 3 | Status: SHIPPED | OUTPATIENT
Start: 2025-02-25 | End: 2025-02-27 | Stop reason: SDUPTHER

## 2025-02-25 RX ORDER — TORSEMIDE 10 MG/1
10 TABLET ORAL DAILY
Qty: 90 TABLET | Refills: 1 | Status: SHIPPED | OUTPATIENT
Start: 2025-02-25 | End: 2026-02-25

## 2025-02-25 RX ORDER — IPRATROPIUM BROMIDE 0.5 MG/2.5ML
500 SOLUTION RESPIRATORY (INHALATION) 2 TIMES DAILY PRN
Qty: 75 ML | Refills: 0 | Status: SHIPPED | OUTPATIENT
Start: 2025-02-25 | End: 2025-03-27
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
## 2025-02-26 ENCOUNTER — TELEPHONE (OUTPATIENT)
Dept: HEMATOLOGY/ONCOLOGY | Facility: CLINIC | Age: 80
End: 2025-02-26
Payer: MEDICARE

## 2025-02-26 ENCOUNTER — TELEPHONE (OUTPATIENT)
Dept: FAMILY MEDICINE | Facility: CLINIC | Age: 80
End: 2025-02-26
Payer: MEDICARE

## 2025-02-26 DIAGNOSIS — G89.29 CHRONIC PAIN OF BOTH LOWER EXTREMITIES: ICD-10-CM

## 2025-02-26 DIAGNOSIS — M79.605 CHRONIC PAIN OF BOTH LOWER EXTREMITIES: ICD-10-CM

## 2025-02-26 DIAGNOSIS — M79.604 CHRONIC PAIN OF BOTH LOWER EXTREMITIES: ICD-10-CM

## 2025-02-26 RX ORDER — HYDROCODONE BITARTRATE AND ACETAMINOPHEN 10; 325 MG/1; MG/1
1 TABLET ORAL EVERY 12 HOURS PRN
Qty: 60 TABLET | Refills: 0 | Status: SHIPPED | OUTPATIENT
Start: 2025-02-26

## 2025-02-26 NOTE — TELEPHONE ENCOUNTER
----- Message from Kimberly sent at 2/26/2025  8:19 AM CST -----  Regarding: Meds  Type:  Pharmacy Calling to Clarify an RXName of Caller:RXPharmacy Name:[facility] [place] - [id number] [address] [address]cription Name:HYDROcodone-acetaminophen (NORCO)  mg per tablet And LORazepam (ATIVAN) 0.5 MG tablet What do they need to clarify?:for Norco in the notes it sts  30day supplies no but needs to say yes. LORazepam (ATIVAN) 0.5 MG tablet  Pt take xanax from another provider and also need to know if both norco and Ativan. Best Call Back Number:[phone] Information: Thanks

## 2025-02-26 NOTE — TELEPHONE ENCOUNTER
Hydrocodone prescription changed  Call the pharmacy and let them know to postpone the Ativan prescription until 30 days out from that last prescription of Xanax

## 2025-02-26 NOTE — TELEPHONE ENCOUNTER
New rx is needed for the  for hydrocodone if it is for 30 days. if just for 7 days, she can keep the current prescription. Pharmacists states that they can only release 7 tablets or no more than 14 days.     XANAX from another provider 8 days ago, did you still want to prescribe the ativan? Will this replace the xanax?

## 2025-02-26 NOTE — TELEPHONE ENCOUNTER
Called patient's daughter, MELISSA to let her know we are still waiting on labs to result to schedule  Cardoso

## 2025-02-27 ENCOUNTER — TELEPHONE (OUTPATIENT)
Dept: HEMATOLOGY/ONCOLOGY | Facility: CLINIC | Age: 80
End: 2025-02-27
Payer: MEDICARE

## 2025-02-27 ENCOUNTER — PATIENT MESSAGE (OUTPATIENT)
Dept: FAMILY MEDICINE | Facility: CLINIC | Age: 80
End: 2025-02-27
Payer: MEDICARE

## 2025-02-27 DIAGNOSIS — M79.605 CHRONIC PAIN OF BOTH LOWER EXTREMITIES: ICD-10-CM

## 2025-02-27 DIAGNOSIS — G89.29 CHRONIC PAIN OF BOTH LOWER EXTREMITIES: ICD-10-CM

## 2025-02-27 DIAGNOSIS — M79.604 CHRONIC PAIN OF BOTH LOWER EXTREMITIES: ICD-10-CM

## 2025-02-27 RX ORDER — GABAPENTIN 100 MG/1
100 CAPSULE ORAL 2 TIMES DAILY
Qty: 180 CAPSULE | Refills: 3 | Status: SHIPPED | OUTPATIENT
Start: 2025-02-27 | End: 2026-02-27

## 2025-02-27 NOTE — TELEPHONE ENCOUNTER
Spoke with patient's daughter and schedule virtual appt for Dr. Radford on 04/22 for hospital f/u and to review labs.      ----- Message from GAURAV Tello sent at 2/18/2025 11:52 AM CST -----  Regarding: New patient  Good morning,We are waiting for a Von willebrand and SPEP/ARACELI to result from his inpatient stay (he's still currently inpatient). His SPEP will result 2/21 and VW will result 2/26. Patient will need a f/u appt to review these labs once resulted. Thanks.Rosas

## 2025-02-27 NOTE — TELEPHONE ENCOUNTER
No care due was identified.  [facility] Embedded Care Due Messages. Reference number: [id number].   2/27/2025 4:14:35 PM CST

## 2025-03-05 ENCOUNTER — TELEPHONE (OUTPATIENT)
Dept: FAMILY MEDICINE | Facility: CLINIC | Age: 80
End: 2025-03-05
Payer: MEDICARE

## 2025-03-05 NOTE — TELEPHONE ENCOUNTER
Spoke with the pharmacy informed them that the patient is to continue taking his xanax as prescribed and fill the ativan once that prescription is done.

## 2025-03-05 NOTE — TELEPHONE ENCOUNTER
----- Message from Delmi sent at 3/5/2025  9:26 AM CST -----  Contact: [facility]   Pharmacy is calling to clarify an RX.RX name:  LORazepam (ATIVAN) 0.5 MG tabletWhat do they need to clarify:  Please call pharmacy they have some questions about filling the prescription early. Comments: [facility] - [place] - [phone] [place] [id number]: [phone] Fax: [phone]

## 2025-03-07 ENCOUNTER — PATIENT MESSAGE (OUTPATIENT)
Dept: FAMILY MEDICINE | Facility: CLINIC | Age: 80
End: 2025-03-07
Payer: MEDICARE

## 2025-03-08 RX ORDER — TAMSULOSIN HYDROCHLORIDE 0.4 MG/1
1 CAPSULE ORAL
Qty: 90 CAPSULE | Refills: 3 | Status: SHIPPED | OUTPATIENT
Start: 2025-03-08

## 2025-03-08 NOTE — TELEPHONE ENCOUNTER
Refill Routing Note   Medication(s) are not appropriate for processing by [facility] for the following reason(s):        No active prescription written by provider  New or recently adjusted medication    [facility] action(s):  Defer             Appointments  past 12m or future 3m with PCP    Date Provider   Last Visit   2/25/2025 Paul Gonzalez, DO   Next Visit   5/26/2025 Paul Gonzalez, DO   ED visits in past 90 days: 0        Note composed:8:24 PM 03/07/2025

## 2025-03-08 NOTE — TELEPHONE ENCOUNTER
No care due was identified.  [facility] Embedded Care Due Messages. Reference number: [id number].   3/07/2025 8:16:30 PM CST

## 2025-03-10 DIAGNOSIS — J44.9 CHRONIC OBSTRUCTIVE PULMONARY DISEASE, UNSPECIFIED COPD TYPE: ICD-10-CM

## 2025-03-10 RX ORDER — IPRATROPIUM BROMIDE 0.5 MG/2.5ML
500 SOLUTION RESPIRATORY (INHALATION) 2 TIMES DAILY PRN
Qty: 75 ML | Refills: 0 | Status: SHIPPED | OUTPATIENT
Start: 2025-03-10 | End: 2025-04-09

## 2025-03-10 NOTE — TELEPHONE ENCOUNTER
No care due was identified.  [facility] Embedded Care Due Messages. Reference number: [id number].   3/10/2025 2:37:53 PM CDT

## 2025-03-10 NOTE — TELEPHONE ENCOUNTER
No care due was identified.  Health [facility] Embedded Care Due Messages. Reference number: [id number].   3/10/2025 10:56:47 AM CDT

## 2025-03-11 RX ORDER — IPRATROPIUM BROMIDE 0.5 MG/2.5ML
SOLUTION RESPIRATORY (INHALATION)
Qty: 62.5 ML | Refills: 0 | OUTPATIENT
Start: 2025-03-11

## 2025-03-11 NOTE — TELEPHONE ENCOUNTER
Refill Decision Note   Evaristo Cardoso  is requesting a refill authorization.  Brief Assessment and Rationale for Refill:  Quick Discontinue     Medication Therapy Plan:  E-Prescribing Status: Receipt confirmed by [facility] 3/10/25      Comments:     Note composed:9:23 AM 03/11/2025

## 2025-03-13 ENCOUNTER — TELEPHONE (OUTPATIENT)
Dept: HEMATOLOGY/ONCOLOGY | Facility: CLINIC | Age: 80
End: 2025-03-13

## 2025-03-13 ENCOUNTER — OFFICE VISIT (OUTPATIENT)
Dept: HEMATOLOGY/ONCOLOGY | Facility: CLINIC | Age: 80
End: 2025-03-13
Payer: MEDICARE

## 2025-03-13 DIAGNOSIS — D69.6 THROMBOCYTOPENIA: Primary | ICD-10-CM

## 2025-03-13 DIAGNOSIS — N17.9 AKI (ACUTE KIDNEY INJURY): ICD-10-CM

## 2025-03-13 DIAGNOSIS — N18.32 STAGE 3B CHRONIC KIDNEY DISEASE: ICD-10-CM

## 2025-03-13 DIAGNOSIS — D64.9 SYMPTOMATIC ANEMIA: ICD-10-CM

## 2025-03-13 DIAGNOSIS — K74.60 CIRRHOSIS OF LIVER WITHOUT ASCITES, UNSPECIFIED HEPATIC CIRRHOSIS TYPE: ICD-10-CM

## 2025-03-13 PROCEDURE — 98006 SYNCH AUDIO-VIDEO EST MOD 30: CPT | Mod: 95,,, | Performed by: INTERNAL MEDICINE

## 2025-03-13 PROCEDURE — G2211 COMPLEX E/M VISIT ADD ON: HCPCS | Mod: 95,,, | Performed by: INTERNAL MEDICINE

## 2025-03-13 NOTE — PROGRESS NOTES
FOLLOW UP TELEMEDICINE VISIT    Subjective:      Patient ID: Evaristo Cardoso is a 79 y.o. male.  MRN: [id number]  : 1945    An audio and visual care visit was performed with the patient because of the COVID-19 pandemic recommendations for social distancing.    TELEMEDICINE  The patient location is: home  The chief complaint leading to consultation is: Thrombocytopenia  Visit type: Virtual visit with synchronous audio and video    Total time spent with patient: 10 minutes  30 minutes of total time spent on the encounter, which includes face to face time and non-face to face time preparing to see the patient (eg, review of tests), obtaining and/or reviewing separately obtained history, documenting clinical information in the electronic or other health record, independently interpreting results (not separately reported) and communicating results to the patient/family/caregiver, or care coordination (not separately reported).    Each patient to whom he or she provides medical services by telemedicine is:  (1) informed of the relationship between the physician and patient and the respective role of any other health care provider with respect to management of the patient; and (2) notified that he or she may decline to receive medical services by telemedicine and may withdraw from such care at any time.    History of Present Illness:   ALAINA Ortega is a 79 y.o male presenting to discuss the work-up done to assess his chronic thrombocytopenia.  He is reporting to this visit with his daughter and granddaughter.    The patient denies CP, cough, SOB, abdominal pain, nausea, vomiting, constipation.  The patient denies fever, chills, night sweats, weight loss, new lumps or bumps, easy bruising or bleeding.    Oncology History:  Oncology History    No history exists.      Past medical, surgical, family, and social history were reviewed today and there are no changes of note unless mentioned in HPI.   MEDS and ALLERGIES  were reviewed with patient and meds reconciled.     History:  Past Medical History:   Diagnosis Date    Aortic stenosis  05/30/2013 2017 Moderate to severe aortic stenosis, EJ = 0.96 cm2, peak velocity = 3.02 m/s, mean gradient = 21 mmHg.     COPD (chronic obstructive pulmonary disease)     Coronary artery disease     Hep C w/o coma, chronic     History of non-ST elevation myocardial infarction (NSTEMI) 04/11/2013    3/1/13 of Diag cutting ptca     HTN (hypertension) 02/25/2013    LGI bleed 04/11/2013    3/13     PVD (peripheral vascular disease) 02/25/2013 1/13 rt EIA- occluded, left EIA 80%     LUIS ALFREDO (renal artery stenosis) 04/11/2013    3/13 40% bilateral LUIS ALFREDO     S/P BKA (below knee amputation) bilateral 02/25/2013    S/P CABG x 2 02/25/2013 1/13 VG-RCA, LIMA-LAD     Statin intolerance 08/28/2018      Past Surgical History:   Procedure Laterality Date    bka  1968    bilateral    INSERTION, CATHETER, TUNNELED  2/11/2025    Procedure: Insertion,catheter,tunneled;  Surgeon: Gloria Barrow MD;  Location: [facility] OR;  Service: General;;    INSERTION, PACEMAKER, TEMPORARY TRANSVENOUS  2/4/2025    Procedure: Temp pacer insertion;  Surgeon: Vernon Kelley MD;  Location: [facility] CATH;  Service: Cardiology;;    PERCUTANEOUS CORONARY INTERVENTION, ARTERY  2/4/2025    Procedure: AO Valvuloplasty;  Surgeon: Vernon Kelley MD;  Location: [facility] CATH;  Service: Cardiology;;     Family History   Problem Relation Name Age of Onset    Breast cancer Daughter      Cancer Daughter      Stroke Father        Social History     Tobacco Use    Smoking status: Every Day     Current packs/day: 0.25     Types: Cigarettes    Smokeless tobacco: Never   Substance and Sexual Activity    Alcohol use: Not Currently    Drug use: Not on file    Sexual activity: Not on file        ROS:  Answers submitted by the patient for this visit:  Review of Systems Questionnaire (Submitted on 3/13/2025)  appetite change : No  unexpected weight change:  No  mouth sores: No  visual disturbance: No  cough: Yes  shortness of breath: Yes  chest pain: No  abdominal pain: No  diarrhea: No  frequency: No  back pain: No  rash: No  headaches: No  adenopathy: No  nervous/ anxious: Yes      Objective:   There were no vitals filed for this visit.  Wt Readings from Last 10 Encounters:   02/25/25 63.5 kg (140 lb)   02/18/25 60.9 kg (134 lb 4.2 oz)   01/17/25 64.7 kg (142 lb 11.2 oz)   01/13/25 60 kg (132 lb 4.4 oz)   06/11/24 65.8 kg (145 lb)   05/20/24 65.8 kg (145 lb 1 oz)   09/18/23 62.7 kg (138 lb 3.7 oz)   12/15/22 66.7 kg (147 lb 0.8 oz)   12/06/22 66.7 kg (147 lb)   06/06/22 67 kg (147 lb 11.3 oz)       Physical Examination:   Constitutional: he is alert, pleasant, and does not appear to be in any physical distress   HENT: Mouth/Throat:  Tongue is midline without evidence of glossitis  Eyes: No obvious jaundice or conjunctivitis.  EOM are normal.   Pulmonary/Chest: No stridor noted. No excess chest muscle movement.  Neurological: he is alert and oriented to person, place, and time. No cranial nerve deficit.  Skin:  No rash noted. No erythema.   Psychiatric: he has a normal mood and affect. Speech and memory normal.     Diagnostic Tests:  Significant Imaging:  I have reviewed and interpreted all pertinent imaging results/findings.    Laboratory Data:  Lab Results   Component Value Date    WBC 9.68 02/18/2025    HGB 8.2 (L) 02/18/2025    HCT 26.3 (L) 02/18/2025    PLT 85 (L) 02/18/2025    CHOL 169 01/12/2025    TRIG 144 01/12/2025    HDL 33 (L) 01/12/2025    ALT 7 (L) 02/18/2025    AST 16 02/18/2025     (L) 02/18/2025    K 3.8 02/18/2025     02/18/2025    CREATININE 2.31 (H) 02/18/2025    BUN 36 (H) 02/18/2025    CO2 26 02/18/2025    TSH 2.648 02/14/2025    PSA 0.46 06/07/2010    INR 1.3 02/14/2025    HGBA1C 5.1 01/09/2025        Labs:   Lab Results   Component Value Date    WBC 9.68 02/18/2025    RBC 2.82 (L) 02/18/2025    HGB 8.2 (L) 02/18/2025    HCT 26.3 (L)  02/18/2025    MCV 93 02/18/2025    PLT 85 (L) 02/18/2025     (H) 02/18/2025     (L) 02/18/2025    K 3.8 02/18/2025    BUN 36 (H) 02/18/2025    CREATININE 2.31 (H) 02/18/2025    AST 16 02/18/2025    ALT 7 (L) 02/18/2025    BILITOT 0.7 02/18/2025         Assessment/Plan:     ECOG SCORE    1 - Restricted in strenuous activity-ambulatory and able to carry out work of a light nature       Thrombocytopenia:  Plat : 85k on 2/18/2025  I reviewed independently the patient medical record including his laboratory and radiologic findings.    The patient has pain chronically thrombocytopenic.  The differential diagnosis could be related to his polypharmacy cirrhotic liver as well as possibly vitamin deficiency thyroid dysfunction or blood related disorder.    Labs on 2/14/2025 Showed a normal SPEP with immunofixation vitamin B12 TSH free T4 folate HIV HIT and Von Willebrand profile.  To be noted that the patient platelet function also could be suboptimal in view of his uremia which could also explain his easy bruisability.   His chronic thrombocytopenia could be realted to his liver cirrhosis vs a bone marrow process.  She is asymptomatic with no signs of active bleeding he will continue to be monitored with repeat blood workup.    He will have his home health draw a CBC CMP this week.    He will be seen back again in 3 months for a follow up visit with repeat CBC CMP and ferritin.      Symptomatic anemia  Hgb: 8.2 g/dl on 2/18/2025  Multifactorial could be related to iron-deficiency anemia as well anemia of chronic kidney disease.    Labs on 2/14/2025:  iron studies showed an iron saturation of 8 and ferritin 88.7. He needs iron  vitamin B12 folate TSH SPEP with immunofixation came back normal  Will defer the use of Epo/iron infusions to Nephrology since the patient is on dialysis.     VEE (acute kidney injury)  On top of his chronic kidney disease stage 3b.    Patient currently is on hemodialysis.    Follow up  Nephrology recommendations.    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  His spleen was normal in size.       Med Onc Chart Routing      Follow up with physician    Follow up with MASOUD 3 months. Virtual with repeat CBC CMP and ferritin.  He will have blood workup drawn this week for CBC and CMP.  His blood workup will be drawn by [facility].   Infusion scheduling note    Injection scheduling note    Labs    Imaging    Pharmacy appointment    Other referrals                   Plan was discussed with the patient at length, and he verbalized understanding. Evaristo was given an opportunity to ask questions that were answered to his satisfaction, and he was advised to call in the interval if any problems or questions arise.  Discussed COVID-19 and social distancing in great detail, avoid all non-essential visits out of the home if possible and avoid sick contacts.     Electronically signed by Amber Radford MD

## 2025-03-13 NOTE — TELEPHONE ENCOUNTER
Faxed over lab orders to [facility] with instructions to draw CBC/CMP this week. He will need CBC/CMP/Ferritin on 6/10/2025. Fax number @ [phone]. Phone number @ [phone]. Called to confirm. Clinic fax number given to fax back results.

## 2025-03-18 PROBLEM — J41.0 SIMPLE CHRONIC BRONCHITIS: Status: ACTIVE | Noted: 2025-03-18

## 2025-03-20 ENCOUNTER — OFFICE VISIT (OUTPATIENT)
Dept: PHYSICAL MEDICINE AND REHAB | Facility: CLINIC | Age: 80
End: 2025-03-20
Payer: MEDICARE

## 2025-03-20 VITALS — SYSTOLIC BLOOD PRESSURE: 115 MMHG | DIASTOLIC BLOOD PRESSURE: 66 MMHG | HEART RATE: 66 BPM

## 2025-03-20 DIAGNOSIS — Z89.512 S/P BKA (BELOW KNEE AMPUTATION) BILATERAL: Primary | ICD-10-CM

## 2025-03-20 DIAGNOSIS — G89.29 CHRONIC PAIN OF BOTH LOWER EXTREMITIES: ICD-10-CM

## 2025-03-20 DIAGNOSIS — M79.605 CHRONIC PAIN OF BOTH LOWER EXTREMITIES: ICD-10-CM

## 2025-03-20 DIAGNOSIS — M79.604 CHRONIC PAIN OF BOTH LOWER EXTREMITIES: ICD-10-CM

## 2025-03-20 DIAGNOSIS — Z89.511 S/P BKA (BELOW KNEE AMPUTATION) BILATERAL: Primary | ICD-10-CM

## 2025-03-20 PROCEDURE — 99204 OFFICE O/P NEW MOD 45 MIN: CPT | Mod: S$GLB,,, | Performed by: STUDENT IN AN ORGANIZED HEALTH CARE EDUCATION/TRAINING PROGRAM

## 2025-03-20 PROCEDURE — 1125F AMNT PAIN NOTED PAIN PRSNT: CPT | Mod: CPTII,S$GLB,, | Performed by: STUDENT IN AN ORGANIZED HEALTH CARE EDUCATION/TRAINING PROGRAM

## 2025-03-20 PROCEDURE — 3078F DIAST BP <80 MM HG: CPT | Mod: CPTII,S$GLB,, | Performed by: STUDENT IN AN ORGANIZED HEALTH CARE EDUCATION/TRAINING PROGRAM

## 2025-03-20 PROCEDURE — 99999 PR PBB SHADOW E&M-EST. PATIENT-LVL II: CPT | Mod: PBBFAC,,, | Performed by: STUDENT IN AN ORGANIZED HEALTH CARE EDUCATION/TRAINING PROGRAM

## 2025-03-20 PROCEDURE — 3074F SYST BP LT 130 MM HG: CPT | Mod: CPTII,S$GLB,, | Performed by: STUDENT IN AN ORGANIZED HEALTH CARE EDUCATION/TRAINING PROGRAM

## 2025-03-20 RX ORDER — GABAPENTIN 100 MG/1
CAPSULE ORAL
Qty: 120 CAPSULE | Refills: 2 | Status: SHIPPED | OUTPATIENT
Start: 2025-03-20
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
## 2025-03-24 DIAGNOSIS — Z00.00 ENCOUNTER FOR MEDICARE ANNUAL WELLNESS EXAM: ICD-10-CM

## 2025-03-26 ENCOUNTER — PATIENT MESSAGE (OUTPATIENT)
Dept: HEMATOLOGY/ONCOLOGY | Facility: CLINIC | Age: 80
End: 2025-03-26
Payer: MEDICARE

## 2025-03-26 DIAGNOSIS — J44.9 CHRONIC OBSTRUCTIVE PULMONARY DISEASE, UNSPECIFIED COPD TYPE: ICD-10-CM

## 2025-03-26 RX ORDER — IPRATROPIUM BROMIDE 0.5 MG/2.5ML
SOLUTION RESPIRATORY (INHALATION)
Qty: 187.5 ML | Refills: 3 | Status: SHIPPED | OUTPATIENT
Start: 2025-03-26

## 2025-03-26 RX ORDER — IPRATROPIUM BROMIDE 0.5 MG/2.5ML
500 SOLUTION RESPIRATORY (INHALATION) 2 TIMES DAILY PRN
Qty: 75 ML | Refills: 0 | OUTPATIENT
Start: 2025-03-26 | End: 2025-04-25

## 2025-03-26 NOTE — TELEPHONE ENCOUNTER
Refill Routing Note   Medication(s) are not appropriate for processing by [facility] for the following reason(s):        New or recently adjusted medication    [facility] action(s):  Defer               Appointments  past 12m or future 3m with PCP    Date Provider   Last Visit   2/25/2025 Paul Gonzalez, DO   Next Visit   5/26/2025 Paul Gonzalez, DO   ED visits in past 90 days: 0        Note composed:4:05 PM 03/26/2025

## 2025-03-26 NOTE — TELEPHONE ENCOUNTER
No care due was identified.  [facility] Embedded Care Due Messages. Reference number: [id number].   3/26/2025 10:32:09 AM CDT

## 2025-03-26 NOTE — TELEPHONE ENCOUNTER
No care due was identified.  [facility] Embedded Care Due Messages. Reference number: [id number].   3/26/2025 6:33:04 PM CDT

## 2025-04-06 PROBLEM — Z98.890 S/P BALLOON AORTIC VALVULOPLASTY: Status: ACTIVE | Noted: 2025-04-06

## 2025-04-21 RX ORDER — FINASTERIDE 5 MG/1
5 TABLET, FILM COATED ORAL
Qty: 90 TABLET | Refills: 3 | Status: SHIPPED | OUTPATIENT
Start: 2025-04-21

## 2025-04-21 NOTE — TELEPHONE ENCOUNTER
No care due was identified.  [facility] Embedded Care Due Messages. Reference number: [id number].   4/21/2025 10:36:23 AM CDT

## 2025-04-21 NOTE — TELEPHONE ENCOUNTER
Refill Routing Note   Medication(s) are not appropriate for processing by [facility] for the following reason(s):        No active prescription written by provider  New or recently adjusted medication    [facility] action(s):  Defer               Appointments  past 12m or future 3m with PCP    Date Provider   Last Visit   2/25/2025 Paul Gonzalez, DO   Next Visit   5/26/2025 Paul Gonzalez, DO   ED visits in past 90 days: 0        Note composed:11:35 AM 04/21/2025

## 2025-04-22 ENCOUNTER — PATIENT MESSAGE (OUTPATIENT)
Dept: FAMILY MEDICINE | Facility: CLINIC | Age: 80
End: 2025-04-22
Payer: MEDICARE

## 2025-04-22 ENCOUNTER — TELEPHONE (OUTPATIENT)
Dept: FAMILY MEDICINE | Facility: CLINIC | Age: 80
End: 2025-04-22
Payer: MEDICARE

## 2025-04-22 NOTE — TELEPHONE ENCOUNTER
----- Message from Maryanne sent at 4/22/2025 12:47 PM CDT -----  Type:  Reschedule Appointment Request Caller is requesting to reschedule appointment.   Name of Caller:Stephanie - Daughter When is the first available appointment?none Symptoms: Would the patient rather a call back or a response via [facility]? call Best Call Back Number:[phone] Additional Information: Pt saw dr in hospital and is ECA. Pt goes to dialysis on Mon Wed & Fri. Needs to reschedule the 5/26 8am appt. No avail on 5/20.      Please call Back to advise. Thanks!

## 2025-04-24 ENCOUNTER — OFFICE VISIT (OUTPATIENT)
Dept: PHYSICAL MEDICINE AND REHAB | Facility: CLINIC | Age: 80
End: 2025-04-24
Payer: MEDICARE

## 2025-04-24 DIAGNOSIS — Z89.512 S/P BKA (BELOW KNEE AMPUTATION) BILATERAL: Primary | ICD-10-CM

## 2025-04-24 DIAGNOSIS — Z89.511 S/P BKA (BELOW KNEE AMPUTATION) BILATERAL: Primary | ICD-10-CM

## 2025-04-24 NOTE — PROGRESS NOTES
[facility] - FACE TO FACE PROSTHETIC NOTE    Consulting Provider: No ref. provider found  Primary Care Provider: Paul Gonzalez DO    CHIEF COMPLAINT:   1. F/u for prosthetics    Interval note on 04/24/2025:  Patient arrives today for scheduled follow up. He notes improved pain control with gabapentin increase.  He is planning to have a visit with [facility] this week to trial his new legs.    LIVING SITUATION: lives in  home with ramp for entry.     PAST MEDICAL HISTORY:  Past Medical History:   Diagnosis Date    Aortic stenosis  05/30/2013 2017 Moderate to severe aortic stenosis, EJ = 0.96 cm2, peak velocity = 3.02 m/s, mean gradient = 21 mmHg.     COPD (chronic obstructive pulmonary disease)     Coronary artery disease     Hep C w/o coma, chronic     History of non-ST elevation myocardial infarction (NSTEMI) 04/11/2013    3/1/13 of Diag cutting ptca     HTN (hypertension) 02/25/2013    LGI bleed 04/11/2013    3/13     PVD (peripheral vascular disease) 02/25/2013 1/13 rt EIA- occluded, left EIA 80%     LUIS ALFREDO (renal artery stenosis) 04/11/2013    3/13 40% bilateral LUIS ALFREDO     S/P BKA (below knee amputation) bilateral 02/25/2013    S/P CABG x 2 02/25/2013 1/13 VG-RCA, LIMA-LAD     Statin intolerance 08/28/2018      PAST SURGICAL HISTORY:   Past Surgical History:   Procedure Laterality Date    bka  1968    bilateral    INSERTION, CATHETER, TUNNELED  2/11/2025    Procedure: Insertion,catheter,tunneled;  Surgeon: Gloria Barrow MD;  Location: [facility] OR;  Service: General;;    INSERTION, PACEMAKER, TEMPORARY TRANSVENOUS  2/4/2025    Procedure: Temp pacer insertion;  Surgeon: Vernon Kelley MD;  Location: [facility] CATH;  Service: Cardiology;;    PERCUTANEOUS CORONARY INTERVENTION, ARTERY  2/4/2025    Procedure: AO Valvuloplasty;  Surgeon: Vernon Kelley MD;  Location: [facility] CATH;  Service: Cardiology;;      FAMILY HISTORY:   Family History    Problem Relation Name Age of Onset    Breast cancer Daughter      Cancer Daughter      Stroke Father       SOCIAL HISTORY:    Social History     Socioeconomic History    Marital status:    Tobacco Use    Smoking status: Former     Current packs/day: 0.00     Average packs/day: 0.3 packs/day for 74.9 years (18.7 ttl pk-yrs)     Types: Cigarettes     Start date:      Quit date: 2024     Years since quittin.3    Smokeless tobacco: Never   Substance and Sexual Activity    Alcohol use: Not Currently     Social Drivers of Health     Financial Resource Strain: Low Risk  (2025)    Overall Financial Resource Strain (CARDIA)     Difficulty of Paying Living Expenses: Not hard at all   Food Insecurity: No Food Insecurity (2025)    Hunger Vital Sign     Worried About Running Out of Food in the Last Year: Never true     Ran Out of Food in the Last Year: Never true   Transportation Needs: No Transportation Needs (2025)    TRANSPORTATION NEEDS     Transportation : No   Stress: No Stress Concern Present (2025)    [employer] - Occupational Stress Questionnaire     Feeling of Stress : Only a little   Housing Stability: Unknown (2025)    Housing Stability Vital Sign     Unable to Pay for Housing in the Last Year: No     Homeless in the Last Year: No     MEDICATIONS:   Current Medications[1]     ALLERGIES:   Review of patient's allergies indicates:   Allergen Reactions    Albuterol     Crestor [rosuvastatin]      myalgia    Ezetimibe     Lipitor [atorvastatin]      myalgia    Liptruzet [ezetimibe-atorvastatin]     Lisinopril Rash     REVIEW OF SYSTEMS: Denies coughs, colds, fevers, chills. No constipation. Bowel movements are regular. No dysphagia. No weight, appetite or sleep concerns. No behavior concerns. No drooling or difficulty handling oral secretions. No skin lesions.     PHYSICAL EXAMINATION:   VITALS: Vitals reviewed in [facility]  GENERAL: The patient is  awake, alert, cooperative, and in no acute distress.   HEENT: Normocephalic, atraumatic. Tracking is in all 4 quadrants. No facial asymmetry.  LUNGS: No increased work of breath.     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 The following recommendations and plan were discussed in depth with him who voiced understanding and was in agreement.     Summary of requested medical equipment: Bilateral prosthetic limbs for the lower extremities pending.      PLAN:   The face-to-face evaluation was completed on: 04/24/2025  Prosthetic recommended:  Bilateral K level 3, low level use.  He has a established care with [facility] for bilateral Prosthetics.  Estimated length of median months: 99  Diagnosis responsible for symptoms: bilateral lower extremity amputees  Continue Gabapentin to 100/100/200 mg from 100 mg BID.   RTC 3 months or prn           [1]   Current Outpatient Medications:     albuterol (PROVENTIL HFA) 90 mcg/actuation inhaler, Inhale 2 puffs into the lungs every 6 (six) hours as needed for Wheezing or Shortness of Breath. Rescue (Patient not taking: Reported on 4/3/2025), Disp: 6.7 g, Rfl: 1    ALPRAZolam (XANAX) 0.25 MG tablet, Take 1 tablet (0.25 mg total) by mouth every evening. (Patient not taking: Reported on 4/3/2025), Disp: 30 tablet, Rfl: 0    aspirin (ECOTRIN) 81 MG EC tablet, Take 81 mg by mouth nightly., Disp: , Rfl:     budesonide (PULMICORT) 0.5 mg/2 mL nebulizer solution, Take 2 mLs (0.5 mg total) by nebulization 2 (two) times daily as needed (shortness of breath or wheezing). Controller, Disp: 60 mL, Rfl: 0    carvediloL (COREG) 12.5 MG tablet, Take 1 tablet (12.5 mg total) by mouth 2 (two) times daily., Disp: 180 tablet, Rfl: 3    ferrous gluconate (FERGON) 324 MG tablet, Take 1 tablet (324 mg total) by mouth daily with breakfast., Disp: 30 tablet, Rfl: 0     finasteride (PROSCAR) 5 mg tablet, TAKE ONE TABLET BY MOUTH ONCE DAILY, Disp: 90 tablet, Rfl: 3    gabapentin (NEURONTIN) 100 MG capsule, Take 100 mg in the morning, 100 mg in the afternoon and 200 mg at night., Disp: 120 capsule, Rfl: 2    HYDROcodone-acetaminophen (NORCO)  mg per tablet, Take 1 tablet by mouth every 12 (twelve) hours as needed (severe pain)., Disp: 60 tablet, Rfl: 0    ipratropium (ATROVENT) 0.02 % nebulizer solution, INHALE 1 vial by NEBULIZER TWICE DAILY AS NEEDED FOR WHEEZING, Disp: 187.5 mL, Rfl: 3    Lactobacillus rhamnosus GG (CULTURELLE) 10 billion cell capsule, Take 1 capsule by mouth 2 (two) times daily., Disp: 60 capsule, Rfl: 1    levalbuterol (XOPENEX HFA) 45 mcg/actuation inhaler, Inhale 1-2 puffs into the lungs every 4 (four) hours as needed for Wheezing. Rescue, Disp: 15 g, Rfl: 1    levalbuterol (XOPENEX) 1.25 mg/3 mL nebulizer solution, Take 3 mLs (1.25 mg total) by nebulization every 4 (four) hours as needed for Wheezing., Disp: 120 mL, Rfl: 3    levoFLOXacin (LEVAQUIN) 500 MG tablet, Take 1 tablet (500 mg total) by mouth every other day. for 10 days, Disp: 5 tablet, Rfl: 0    LORazepam (ATIVAN) 0.5 MG tablet, Take 1 tablet (0.5 mg total) by mouth every 12 (twelve) hours as needed for Anxiety., Disp: 60 tablet, Rfl: 0    magnesium oxide (MAG-OX) 400 mg (241.3 mg magnesium) tablet, Take 1 tablet (400 mg total) by mouth once daily., Disp: 30 tablet, Rfl: 1    nicotine (NICODERM CQ) 21 mg/24 hr, Place 1 patch onto the skin once daily., Disp: 30 patch, Rfl: 0    nitroGLYCERIN (NITROSTAT) 0.4 MG SL tablet, Place 1 tablet (0.4 mg total) under the tongue every 5 (five) minutes as needed for Chest pain., Disp: , Rfl:     pantoprazole (PROTONIX) 40 MG tablet, Take 1 tablet (40 mg total) by mouth once daily., Disp: 90 tablet, Rfl: 3    polyethylene glycol (GLYCOLAX) 17 gram PwPk, Take 17 g by mouth 2 (two) times daily. (Patient not taking: Reported on 4/3/2025), Disp: 60 packet, Rfl:  1    ALISTAIR-HERMINIA RX 1- mg-mg-mcg Tab, Take 1 tablet by mouth., Disp: , Rfl:     senna-docusate 8.6-50 mg (PERICOLACE) 8.6-50 mg per tablet, Take 1 tablet by mouth 2 (two) times daily., Disp: 60 tablet, Rfl: 1    sodium chloride 3% 3 % nebulizer solution, TAKE 4 MLS BY NEBULIZATION ONCE DAILY., Disp: 750 mL, Rfl: 3    tamsulosin (FLOMAX) 0.4 mg Cap, TAKE ONE CAPSULE BY MOUTH once DAILY, Disp: 90 capsule, Rfl: 3    tiotropium bromide (SPIRIVA RESPIMAT) 2.5 mcg/actuation inhaler, Inhale 2 puffs into the lungs once daily. Controller, Disp: 4 g, Rfl: 5    torsemide (DEMADEX) 10 MG Tab, Take 1 tablet (10 mg total) by mouth once daily., Disp: 90 tablet, Rfl: 1    torsemide (DEMADEX) 20 MG Tab, Take 1 tablet (20 mg total) by mouth every morning., Disp: 30 tablet, Rfl: 1

## 2025-04-29 DIAGNOSIS — M79.604 CHRONIC PAIN OF BOTH LOWER EXTREMITIES: ICD-10-CM

## 2025-04-29 DIAGNOSIS — G89.29 CHRONIC PAIN OF BOTH LOWER EXTREMITIES: ICD-10-CM

## 2025-04-29 DIAGNOSIS — M79.605 CHRONIC PAIN OF BOTH LOWER EXTREMITIES: ICD-10-CM

## 2025-04-29 RX ORDER — HYDROCODONE BITARTRATE AND ACETAMINOPHEN 10; 325 MG/1; MG/1
1 TABLET ORAL EVERY 12 HOURS PRN
Qty: 60 TABLET | Refills: 0 | Status: SHIPPED | OUTPATIENT
Start: 2025-04-29

## 2025-04-29 NOTE — TELEPHONE ENCOUNTER
No care due was identified.  [facility] Embedded Care Due Messages. Reference number: [id number].   4/29/2025 7:18:24 AM CDT

## 2025-05-09 ENCOUNTER — DOCUMENT SCAN (OUTPATIENT)
Dept: HOME HEALTH SERVICES | Facility: HOSPITAL | Age: 80
End: 2025-05-09
Payer: MEDICARE

## 2025-05-27 ENCOUNTER — OFFICE VISIT (OUTPATIENT)
Dept: FAMILY MEDICINE | Facility: CLINIC | Age: 80
End: 2025-05-27
Payer: MEDICARE

## 2025-05-27 VITALS
HEIGHT: 66 IN | HEART RATE: 80 BPM | BODY MASS INDEX: 22.02 KG/M2 | SYSTOLIC BLOOD PRESSURE: 124 MMHG | WEIGHT: 137 LBS | DIASTOLIC BLOOD PRESSURE: 72 MMHG

## 2025-05-27 DIAGNOSIS — N18.6 ESRD (END STAGE RENAL DISEASE): ICD-10-CM

## 2025-05-27 DIAGNOSIS — G47.00 INSOMNIA, UNSPECIFIED TYPE: Primary | ICD-10-CM

## 2025-05-27 DIAGNOSIS — J96.11 CHRONIC HYPOXEMIC RESPIRATORY FAILURE: ICD-10-CM

## 2025-05-27 DIAGNOSIS — Z76.89 ENCOUNTER TO ESTABLISH CARE: ICD-10-CM

## 2025-05-27 PROBLEM — N18.32 STAGE 3B CHRONIC KIDNEY DISEASE: Status: RESOLVED | Noted: 2023-09-18 | Resolved: 2025-05-27

## 2025-05-27 PROCEDURE — 1160F RVW MEDS BY RX/DR IN RCRD: CPT | Mod: CPTII,S$GLB,, | Performed by: INTERNAL MEDICINE

## 2025-05-27 PROCEDURE — 99999 PR PBB SHADOW E&M-EST. PATIENT-LVL V: CPT | Mod: PBBFAC,,, | Performed by: INTERNAL MEDICINE

## 2025-05-27 PROCEDURE — 3074F SYST BP LT 130 MM HG: CPT | Mod: CPTII,S$GLB,, | Performed by: INTERNAL MEDICINE

## 2025-05-27 PROCEDURE — 1101F PT FALLS ASSESS-DOCD LE1/YR: CPT | Mod: CPTII,S$GLB,, | Performed by: INTERNAL MEDICINE

## 2025-05-27 PROCEDURE — 1159F MED LIST DOCD IN RCRD: CPT | Mod: CPTII,S$GLB,, | Performed by: INTERNAL MEDICINE

## 2025-05-27 PROCEDURE — 1125F AMNT PAIN NOTED PAIN PRSNT: CPT | Mod: CPTII,S$GLB,, | Performed by: INTERNAL MEDICINE

## 2025-05-27 PROCEDURE — 99214 OFFICE O/P EST MOD 30 MIN: CPT | Mod: S$GLB,,, | Performed by: INTERNAL MEDICINE

## 2025-05-27 PROCEDURE — G2211 COMPLEX E/M VISIT ADD ON: HCPCS | Mod: S$GLB,,, | Performed by: INTERNAL MEDICINE

## 2025-05-27 PROCEDURE — 3078F DIAST BP <80 MM HG: CPT | Mod: CPTII,S$GLB,, | Performed by: INTERNAL MEDICINE

## 2025-05-27 PROCEDURE — 3288F FALL RISK ASSESSMENT DOCD: CPT | Mod: CPTII,S$GLB,, | Performed by: INTERNAL MEDICINE

## 2025-05-27 RX ORDER — TRAZODONE HYDROCHLORIDE 50 MG/1
50 TABLET ORAL NIGHTLY
Qty: 90 TABLET | Refills: 1 | Status: SHIPPED | OUTPATIENT
Start: 2025-05-27 | End: 2026-05-27

## 2025-05-27 NOTE — PROGRESS NOTES
Patient ID: Evaristo Cardoso is a 79 y.o. male.    Chief Complaint: Establish Care       Assessment and plan   Insomnia, unspecified type  -     traZODone (DESYREL) 50 MG tablet; Take 1 tablet (50 mg total) by mouth every evening.  Dispense: 90 tablet; Refill: 1    Encounter to establish care       Start trazodone  Continue hydrocodone p.r.n.   Consider switching gabapentin to pregabalin  Continue Ativan p.r.n. provided naloxone prescription last visit  Follow-up virtually in 1 month     History of present illness     He is a 79-year-old male with a past medical history of cirrhosis, coronary artery disease status post CABG, status post bilateral BKA, hypertension, ESRD on dialysis.  Chronic bilateral lower extremity pain.  He presents today to establish care with his daughter.  Last visit was a hospital follow-up.  I increased torsemide to 30 mg.  Since last visit he saw Cardiology.  He is currently taking torsemide 20 mg on non dialysis days.  He also saw vascular surgery and is planning to have a left AV fistula created.  His nephrologist is Dr. Torres.  He continues to take hydrocodone  about half a tablet once every 3 days for chronic bilateral leg pain.  He also takes gabapentin.  He did get bilateral orthotics after establishing with PMR.  He is now on chronic O2 therapy and follows with Dr. Gandara.  He is having trouble sleeping and I do recommend he do a trial of trazodone.  Daughter supports this as well.    Review of Systems   Constitutional:  Negative for fever.   Respiratory:  Positive for shortness of breath.    Cardiovascular:  Negative for chest pain.   Gastrointestinal:  Negative for abdominal pain.       I personally reviewed past medical, family and social history.     Objective    Vitals:    05/27/25 1520   BP: 124/72   Pulse: 80      Wt Readings from Last 3 Encounters:   05/27/25 1520 62.1 kg (137 lb)   05/20/25 1402 62.3 kg (137 lb 6.4 oz)   03/18/25 0955 63.5 kg (140 lb)      Body  mass index is 22.11 kg/m².     Physical Exam  Cardiovascular:      Rate and Rhythm: Normal rate and regular rhythm.      Heart sounds: Murmur heard.      No gallop.   Pulmonary:      Breath sounds: Normal breath sounds. No wheezing or rhonchi.   Abdominal:      Palpations: Abdomen is soft.      Tenderness: There is no abdominal tenderness.        Reference     : Visit today included increased complexity associated with the care of the episodic problem Insomnia, unspecified type [G47.00] addressed and managing the longitudinal care of the patient due to the serious and/or complex managed problem(s)    Active Problem List with Overview Notes    Diagnosis Date Noted    Chronic hypoxemic respiratory failure 05/27/2025    ESRD (end stage renal disease) 05/27/2025    Heart failure with preserved ejection fraction 02/25/2025    Cirrhosis of liver without ascites 01/12/2025    Thrombocytopenia 01/12/2025    Moderate aortic stenosis 01/11/2025    Mitral regurgitation and aortic stenosis 05/30/2013     2017  Moderate to severe aortic stenosis, EJ = 0.96 cm2, peak velocity = 3.02 m/s, mean gradient = 21 mmHg.       CAD (coronary artery disease)-status post CABG 02/25/2013 1/13 Ld80%, cx 50%, rca 99%  3/1/13 LAD 85%, cx- 60%, ramus 60%, rca-80%, vg-rca and Lima-LAD patent      Essential hypertension 02/25/2013    Hyperlipidemia 02/25/2013    S/P BKA (below knee amputation) bilateral 02/25/2013    S/P balloon aortic valvuloplasty 04/06/2025 02/2025      Simple chronic bronchitis 03/18/2025    COPD (chronic obstructive pulmonary disease)     Age-related physical debility 02/18/2025    Sacral wound, initial encounter 02/15/2025    Drug-induced constipation 02/14/2025    BPH with urinary obstruction 02/06/2025    Complex renal cyst 02/06/2025    Bronchitis 02/02/2025    Other reduced mobility 01/31/2025    Symptomatic anemia 01/28/2025    Encounter for palliative care 01/28/2025    ACP (advance care planning)  01/27/2025    Acute on chronic diastolic heart failure 01/13/2025    Bilateral pleural effusion 01/12/2025    Acute on chronic heart failure with preserved ejection fraction (HFpEF) 01/12/2025    Aneurysm of common iliac artery 01/12/2025     his measures 2.7 x 5.0 cm. Ectasia of the abdominal aorta. The right common iliac artery is aneurysmal at 5.5 x 5.2 cm. Aneurysm of the left common iliac artery measuring 4.3 x 2.5 cm. Diffuse atheromatous disease identified. No evidence for adenopathy. .        Infrarenal abdominal aortic aneurysm (AAA) without rupture 01/12/2025     2.7 x 5 cm       Constipation 01/12/2025    Kidney lesion, native, left 01/10/2025    COPD exacerbation 01/09/2025    TORIBIO (dyspnea on exertion) 01/08/2025    VEE (acute kidney injury) 01/08/2025    Other specified anemias 01/08/2025    Medical non-compliance 05/21/2024    Carotid stenosis, asymptomatic, right 12/15/2022    Long term (current) use of antithrombotics/antiplatelets 06/06/2022    Right carotid bruit 06/06/2022    Tobacco abuse counseling 06/06/2022    Statin intolerance 08/28/2018    History of non-ST elevation myocardial infarction (NSTEMI) 04/11/2013     3/1/13 of Diag cutting ptca      LGI bleed 04/11/2013     3/13      LUIS ALFREDO (renal artery stenosis) 04/11/2013     3/13 40% bilateral LUIS ALFREDO      Tobacco abuse 04/11/2013    S/P CABG x 2 02/25/2013 1/13 VG-RCA, LIMA-LAD      PVD (peripheral vascular disease) 02/25/2013 1/13 rt EIA- occluded, left EIA 80%             Hypertension Medications              carvediloL (COREG) 6.25 MG tablet P.o. q.12 hours except on days of dialysis do half p.o. q.12 hours    nitroGLYCERIN (NITROSTAT) 0.4 MG SL tablet Place 1 tablet (0.4 mg total) under the tongue every 5 (five) minutes as needed for Chest pain.    torsemide (DEMADEX) 20 MG Tab Take 1 tablet (20 mg total) by mouth every morning.              Medication List with Changes/Refills   New Medications    TRAZODONE (DESYREL) 50 MG TABLET     Take 1 tablet (50 mg total) by mouth every evening.   Current Medications    ALBUTEROL (PROVENTIL HFA) 90 MCG/ACTUATION INHALER    Inhale 2 puffs into the lungs every 6 (six) hours as needed for Wheezing or Shortness of Breath. Rescue    ALPRAZOLAM (XANAX) 0.25 MG TABLET    Take 1 tablet (0.25 mg total) by mouth every evening.    ASPIRIN (ECOTRIN) 81 MG EC TABLET    Take 1 tablet (81 mg total) by mouth every Mon, Wed, Fri.    BUDESONIDE (PULMICORT) 0.5 MG/2 ML NEBULIZER SOLUTION    Take 2 mLs (0.5 mg total) by nebulization once daily. Controller    CARVEDILOL (COREG) 6.25 MG TABLET    P.o. q.12 hours except on days of dialysis do half p.o. q.12 hours    FERROUS GLUCONATE (FERGON) 324 MG TABLET    Take 1 tablet (324 mg total) by mouth daily with breakfast.    FINASTERIDE (PROSCAR) 5 MG TABLET    TAKE ONE TABLET BY MOUTH ONCE DAILY    GABAPENTIN (NEURONTIN) 100 MG CAPSULE    Take 100 mg in the morning, 100 mg in the afternoon and 200 mg at night.    HYDROCODONE-ACETAMINOPHEN (NORCO)  MG PER TABLET    Take 1 tablet by mouth every 12 (twelve) hours as needed (severe pain).    IPRATROPIUM (ATROVENT) 0.02 % NEBULIZER SOLUTION    INHALE 1 vial by NEBULIZER TWICE DAILY AS NEEDED FOR WHEEZING    LACTOBACILLUS RHAMNOSUS GG (CULTURELLE) 10 BILLION CELL CAPSULE    Take 1 capsule by mouth 2 (two) times daily.    LEVALBUTEROL (XOPENEX HFA) 45 MCG/ACTUATION INHALER    Inhale 1-2 puffs into the lungs every 4 (four) hours as needed for Wheezing. Rescue    LEVALBUTEROL (XOPENEX) 1.25 MG/3 ML NEBULIZER SOLUTION    Take 3 mLs (1.25 mg total) by nebulization every 4 (four) hours as needed for Wheezing.    LORAZEPAM (ATIVAN) 0.5 MG TABLET    Take 1 tablet (0.5 mg total) by mouth every 12 (twelve) hours as needed for Anxiety.    MAGNESIUM OXIDE (MAG-OX) 400 MG (241.3 MG MAGNESIUM) TABLET    Take 1 tablet (400 mg total) by mouth once daily.    NICOTINE (NICODERM CQ) 21 MG/24 HR    Place 1 patch onto the skin once daily.     NITROGLYCERIN (NITROSTAT) 0.4 MG SL TABLET    Place 1 tablet (0.4 mg total) under the tongue every 5 (five) minutes as needed for Chest pain.    PANTOPRAZOLE (PROTONIX) 40 MG TABLET    Take 1 tablet (40 mg total) by mouth once daily.    POLYETHYLENE GLYCOL (GLYCOLAX) 17 GRAM PWPK    Take 17 g by mouth 2 (two) times daily.    ALISTAIR-HERMINIA RX 1- MG-MG-MCG TAB    Take 1 tablet by mouth.    SENNA-DOCUSATE 8.6-50 MG (PERICOLACE) 8.6-50 MG PER TABLET    Take 1 tablet by mouth 2 (two) times daily.    SODIUM CHLORIDE 3% 3 % NEBULIZER SOLUTION    TAKE 4 MLS BY NEBULIZATION ONCE DAILY.    TAMSULOSIN (FLOMAX) 0.4 MG CAP    TAKE ONE CAPSULE BY MOUTH once DAILY    TIOTROPIUM-OLODATEROL (STIOLTO RESPIMAT) 2.5-2.5 MCG/ACTUATION MIST    Inhale 2 puffs into the lungs Daily. Controller    TORSEMIDE (DEMADEX) 20 MG TAB    Take 1 tablet (20 mg total) by mouth every morning.

## 2025-06-10 ENCOUNTER — PATIENT MESSAGE (OUTPATIENT)
Dept: HEMATOLOGY/ONCOLOGY | Facility: CLINIC | Age: 80
End: 2025-06-10
Payer: MEDICARE

## 2025-06-10 DIAGNOSIS — D64.9 SYMPTOMATIC ANEMIA: ICD-10-CM

## 2025-06-10 DIAGNOSIS — F41.9 ANXIETY: ICD-10-CM

## 2025-06-10 DIAGNOSIS — D69.6 THROMBOCYTOPENIA: Primary | ICD-10-CM

## 2025-06-10 RX ORDER — LORAZEPAM 0.5 MG/1
0.5 TABLET ORAL EVERY 12 HOURS PRN
Qty: 60 TABLET | Refills: 2 | Status: SHIPPED | OUTPATIENT
Start: 2025-06-10 | End: 2025-07-10

## 2025-06-10 NOTE — TELEPHONE ENCOUNTER
No care due was identified.  [facility] Embedded Care Due Messages. Reference number: [id number].   6/10/2025 9:33:52 AM CDT

## 2025-06-12 ENCOUNTER — OFFICE VISIT (OUTPATIENT)
Dept: PAIN MEDICINE | Facility: CLINIC | Age: 80
End: 2025-06-12
Payer: MEDICARE

## 2025-06-12 ENCOUNTER — HOSPITAL ENCOUNTER (OUTPATIENT)
Dept: RADIOLOGY | Facility: HOSPITAL | Age: 80
Discharge: HOME | End: 2025-06-12
Attending: STUDENT IN AN ORGANIZED HEALTH CARE EDUCATION/TRAINING PROGRAM
Payer: MEDICARE

## 2025-06-12 ENCOUNTER — TELEPHONE (OUTPATIENT)
Dept: PAIN MEDICINE | Facility: CLINIC | Age: 80
End: 2025-06-12
Payer: MEDICARE

## 2025-06-12 ENCOUNTER — PATIENT MESSAGE (OUTPATIENT)
Dept: HEMATOLOGY/ONCOLOGY | Facility: CLINIC | Age: 80
End: 2025-06-12
Payer: MEDICARE

## 2025-06-12 VITALS — HEIGHT: 66 IN | BODY MASS INDEX: 22.02 KG/M2 | WEIGHT: 137 LBS

## 2025-06-12 DIAGNOSIS — M54.9 BACK PAIN, UNSPECIFIED BACK LOCATION, UNSPECIFIED BACK PAIN LATERALITY, UNSPECIFIED CHRONICITY: Primary | ICD-10-CM

## 2025-06-12 DIAGNOSIS — G54.6 PHANTOM LIMB PAIN: ICD-10-CM

## 2025-06-12 DIAGNOSIS — M54.9 BACK PAIN, UNSPECIFIED BACK LOCATION, UNSPECIFIED BACK PAIN LATERALITY, UNSPECIFIED CHRONICITY: ICD-10-CM

## 2025-06-12 PROCEDURE — 72114 X-RAY EXAM L-S SPINE BENDING: CPT | Mod: TC,PO

## 2025-06-12 PROCEDURE — 1101F PT FALLS ASSESS-DOCD LE1/YR: CPT | Mod: CPTII,S$GLB,, | Performed by: STUDENT IN AN ORGANIZED HEALTH CARE EDUCATION/TRAINING PROGRAM

## 2025-06-12 PROCEDURE — 99999 PR PBB SHADOW E&M-EST. PATIENT-LVL IV: CPT | Mod: PBBFAC,,, | Performed by: STUDENT IN AN ORGANIZED HEALTH CARE EDUCATION/TRAINING PROGRAM

## 2025-06-12 PROCEDURE — 99204 OFFICE O/P NEW MOD 45 MIN: CPT | Mod: S$GLB,,, | Performed by: STUDENT IN AN ORGANIZED HEALTH CARE EDUCATION/TRAINING PROGRAM

## 2025-06-12 PROCEDURE — 1160F RVW MEDS BY RX/DR IN RCRD: CPT | Mod: CPTII,S$GLB,, | Performed by: STUDENT IN AN ORGANIZED HEALTH CARE EDUCATION/TRAINING PROGRAM

## 2025-06-12 PROCEDURE — 72114 X-RAY EXAM L-S SPINE BENDING: CPT | Mod: 26,,, | Performed by: RADIOLOGY

## 2025-06-12 PROCEDURE — 1159F MED LIST DOCD IN RCRD: CPT | Mod: CPTII,S$GLB,, | Performed by: STUDENT IN AN ORGANIZED HEALTH CARE EDUCATION/TRAINING PROGRAM

## 2025-06-12 PROCEDURE — 3288F FALL RISK ASSESSMENT DOCD: CPT | Mod: CPTII,S$GLB,, | Performed by: STUDENT IN AN ORGANIZED HEALTH CARE EDUCATION/TRAINING PROGRAM

## 2025-06-12 PROCEDURE — 1125F AMNT PAIN NOTED PAIN PRSNT: CPT | Mod: CPTII,S$GLB,, | Performed by: STUDENT IN AN ORGANIZED HEALTH CARE EDUCATION/TRAINING PROGRAM

## 2025-06-12 RX ORDER — TIZANIDINE 4 MG/1
2 TABLET ORAL EVERY 8 HOURS
Qty: 30 TABLET | Refills: 2 | Status: SHIPPED | OUTPATIENT
Start: 2025-06-12 | End: 2025-06-22

## 2025-06-12 NOTE — TELEPHONE ENCOUNTER
Types of orders made on 06/12/2025: Imaging, Medications, Procedure Request      Order Date:6/12/2025   Ordering User:MELVIN GONZALEZ [[id number]]   Encounter Provider:Melvin Gonzalez MD [[id number]]   Authorizing Provider: Melvin Gonzalez MD [[id number]]   Department:[facility] PAIN MANAGEMENT[[id number]]      Common Order Information   Procedure -> Other (Specify location and laterality) Cmt: Left lumbar             sympathetic block      Order Specific Information   Order: Procedure Request Order for Pain Management [Custom: [id number]]  Order #:          [id number]: 1 FUTURE     Priority: Routine  Class: Clinic Performed     Future Order Information       Expires on:06/12/2026            Expected by:06/12/2025                   Associated Diagnoses       G54.6 Phantom limb pain       Facility Name: -> [facility]          Follow-up: -> 2 weeks              Priority: Routine  Class: Clinic Performed     Future Order Information       Expires on:06/12/2026            Expected by:06/12/2025                   Associated Diagnoses       G54.6 Phantom limb pain       Procedure -> Other (Specify location and laterality) Cmt: Left lumbar                 sympathetic block          Facility Name: -> [facility]

## 2025-06-13 ENCOUNTER — OFFICE VISIT (OUTPATIENT)
Dept: HEMATOLOGY/ONCOLOGY | Facility: CLINIC | Age: 80
End: 2025-06-13
Payer: MEDICARE

## 2025-06-13 DIAGNOSIS — K74.60 CIRRHOSIS OF LIVER WITHOUT ASCITES, UNSPECIFIED HEPATIC CIRRHOSIS TYPE: ICD-10-CM

## 2025-06-13 DIAGNOSIS — G54.6 PHANTOM LIMB PAIN: Primary | ICD-10-CM

## 2025-06-13 DIAGNOSIS — D69.6 THROMBOCYTOPENIA: Primary | ICD-10-CM

## 2025-06-13 DIAGNOSIS — N18.6 ESRD (END STAGE RENAL DISEASE): ICD-10-CM

## 2025-06-13 RX ORDER — SODIUM CHLORIDE, SODIUM LACTATE, POTASSIUM CHLORIDE, CALCIUM CHLORIDE 600; 310; 30; 20 MG/100ML; MG/100ML; MG/100ML; MG/100ML
INJECTION, SOLUTION INTRAVENOUS CONTINUOUS
OUTPATIENT
Start: 2025-06-13

## 2025-06-13 NOTE — PROGRESS NOTES
FOLLOW UP TELEMEDICINE VISIT    Subjective:      Patient ID: Evaristo Cardoso is a 79 y.o. male.  MRN: [id number]  : 1945    TELEMEDICINE  The patient location is: home  The chief complaint leading to consultation is: Thrombocytopenia  Visit type: Virtual visit with synchronous audio and video    Total time spent with patient: 20 minutes of total time spent on the encounter, which includes face to face time and non-face to face time preparing to see the patient (eg, review of tests), obtaining and/or reviewing separately obtained history, documenting clinical information in the electronic or other health record, independently interpreting results (not separately reported) and communicating results to the patient/family/caregiver, or care coordination (not separately reported).    Each patient to whom he or she provides medical services by telemedicine is:  (1) informed of the relationship between the physician and patient and the respective role of any other health care provider with respect to management of the patient; and (2) notified that he or she may decline to receive medical services by telemedicine and may withdraw from such care at any time.    History of Present Illness:   ALAINA Ortega is a 79 y.o male presenting to discuss the work-up done to assess his chronic thrombocytopenia.  He is reporting to this visit with his granddaughter.  He reports that his Nephrologist is cutting down his dialysis to 2 days a week Monday &  instead of 3 due to improvement in his kidney function.  He denies any recent infection or hospitalization, fevers, chills, abdominal discomfort, N/V, bleeding or bruising.  The patient denies CP, cough, SOB, abdominal pain, nausea, vomiting, constipation, night sweats, weight loss, new lumps or bumps.    Oncology History:     Past medical, surgical, family, and social history were reviewed today and there are no changes of note unless mentioned in HPI.   MEDS and  ALLERGIES were reviewed with patient and meds reconciled.     History:  Past Medical History:   Diagnosis Date    Aortic stenosis  2013 Moderate to severe aortic stenosis, EJ = 0.96 cm2, peak velocity = 3.02 m/s, mean gradient = 21 mmHg.     COPD (chronic obstructive pulmonary disease)     Coronary artery disease     Hep C w/o coma, chronic     History of non-ST elevation myocardial infarction (NSTEMI) 2013    3/1/13 of Diag cutting ptca     HTN (hypertension) 2013    LGI bleed 2013    3/13     PVD (peripheral vascular disease) 2013 rt EIA- occluded, left EIA 80%     LUIS ALFREDO (renal artery stenosis) 2013    3/13 40% bilateral LUIS ALFREDO     S/P BKA (below knee amputation) bilateral 2013    S/P CABG x 2 2013 VG-RCA, LIMA-LAD     Statin intolerance 2018      Past Surgical History:   Procedure Laterality Date    bk  1968    bilateral    INSERTION, CATHETER, TUNNELED  2025    Procedure: Insertion,catheter,tunneled;  Surgeon: Gloria Barrow MD;  Location: [facility] OR;  Service: General;;    INSERTION, PACEMAKER, TEMPORARY TRANSVENOUS  2025    Procedure: Temp pacer insertion;  Surgeon: Vernon Kelley MD;  Location: [facility] CATH;  Service: Cardiology;;    PERCUTANEOUS CORONARY INTERVENTION, ARTERY  2025    Procedure: AO Valvuloplasty;  Surgeon: Vernon Kelley MD;  Location: [facility] CATH;  Service: Cardiology;;     Family History   Problem Relation Name Age of Onset    Breast cancer Daughter      Cancer Daughter      Stroke Father        Social History     Tobacco Use    Smoking status: Former     Current packs/day: 0.00     Average packs/day: 0.3 packs/day for 74.9 years (18.7 ttl pk-yrs)     Types: Cigarettes     Start date:      Quit date: 2024     Years since quittin.5    Smokeless tobacco: Never   Substance and Sexual Activity    Alcohol use: Not Currently    Drug use: Not on file    Sexual activity: Not on file         ROS:  Answers submitted by the patient for this visit:  Review of Systems Questionnaire (Submitted on 6/13/2025)  appetite change : No  unexpected weight change: No  mouth sores: No  visual disturbance: No  cough: No  shortness of breath: No  chest pain: No  abdominal pain: No  diarrhea: No  frequency: No  back pain: No  rash: No  headaches: No  adenopathy: No  nervous/ anxious: No      Objective:   There were no vitals filed for this visit.  Wt Readings from Last 10 Encounters:   06/12/25 62.1 kg (137 lb)   05/27/25 62.1 kg (137 lb)   05/20/25 62.3 kg (137 lb 6.4 oz)   03/18/25 63.5 kg (140 lb)   02/25/25 63.5 kg (140 lb)   02/18/25 60.9 kg (134 lb 4.2 oz)   01/17/25 64.7 kg (142 lb 11.2 oz)   01/13/25 60 kg (132 lb 4.4 oz)   06/11/24 65.8 kg (145 lb)   05/20/24 65.8 kg (145 lb 1 oz)       Physical Examination:   Constitutional: he is alert, pleasant, and does not appear to be in any physical distress   Eyes: No obvious jaundice or conjunctivitis.  EOM are normal.   Pulmonary/Chest: No stridor noted. No excess chest muscle movement.  Neurological: he is alert and oriented to person, place, and time. No cranial nerve deficit.  Psychiatric: he has a normal mood and affect. Speech and memory normal.     Diagnostic Tests:  Significant Imaging:  I have reviewed and interpreted all pertinent imaging results/findings.    Labs reviewed  Laboratory Data:  Lab Results   Component Value Date    WBC 6.70 06/12/2025    HGB 9.0 (L) 06/12/2025    HCT 30.4 (L) 06/12/2025     (L) 06/12/2025    CHOL 169 01/12/2025    TRIG 144 01/12/2025    HDL 33 (L) 01/12/2025    ALT 23 06/12/2025    AST 32 06/12/2025     (L) 06/12/2025    K 4.0 06/12/2025    CL 98 06/12/2025    CREATININE 2.7 (H) 06/12/2025    BUN 42 (H) 06/12/2025    CO2 24 06/12/2025    TSH 2.648 02/14/2025    PSA 0.46 06/07/2010    INR 1.3 02/14/2025    HGBA1C 5.1 01/09/2025        Labs:   Lab Results   Component Value Date    WBC 6.70 06/12/2025    RBC 3.39  (L) 06/12/2025    HGB 9.0 (L) 06/12/2025    HCT 30.4 (L) 06/12/2025    MCV 90 06/12/2025     (L) 06/12/2025     06/12/2025     (L) 06/12/2025    K 4.0 06/12/2025    BUN 42 (H) 06/12/2025    CREATININE 2.7 (H) 06/12/2025    AST 32 06/12/2025    ALT 23 06/12/2025    BILITOT 0.5 06/12/2025         Assessment/Plan:   Thrombocytopenia:  Plat : 85k on 2/18/2025  I reviewed independently the patient medical record including his laboratory and radiologic findings.    The patient has pain chronically thrombocytopenic.  The differential diagnosis could be related to his polypharmacy cirrhotic liver as well as possibly vitamin deficiency thyroid dysfunction or blood related disorder.    Labs on 2/14/2025 Showed a normal SPEP with immunofixation vitamin B12 TSH free T4 folate HIV HIT and Von Willebrand profile.  To be noted that the patient platelet function also could be suboptimal in view of his uremia which could also explain his easy bruisability.   His chronic thrombocytopenia could be realted to his liver cirrhosis vs a bone marrow process.  She is asymptomatic with no signs of active bleeding he will continue to be monitored with repeat blood workup.    He will have his home health draw a CBC CMP this week.    He will be seen back again in 3 months for a follow up visit with repeat CBC CMP and ferritin.    06/13/25:  Platelets stable @ 108 on 06/12/25.  Will continue to monitor in 3 months with labs the day prior:  CBC, CMP, Ferritin.    Symptomatic anemia  Hgb: 8.2 g/dl on 2/18/2025  Multifactorial could be related to iron-deficiency anemia as well anemia of chronic kidney disease.    Labs on 2/14/2025:  iron studies showed an iron saturation of 8 and ferritin 88.7. He needs iron  vitamin B12 folate TSH SPEP with immunofixation came back normal  Will defer the use of Epo/iron infusions to Nephrology since the patient is on dialysis.  06/13/25:  Hgb stable @ 9.0 g/dl; receiving Dialysis; last today;  next week will be on Monday & Friday going forwar.     VEE (acute kidney injury)  On top of his chronic kidney disease stage 3b.    Patient currently is on hemodialysis.    Follow up Nephrology recommendations.  06/13/25:  see above    Liver cirrhosis:  CT of the abdomen and pelvis done on January 6, 2025 showed a cirrhotic morphology liver with findings concerning for nutmeg liver from congestive/diastolic heart failure.  His spleen was normal in size.   06/13/25:  Alert & with mental clarity.  Liver enzymes wnl.      Med Onc Chart Routing      Follow up with physician 3 months. f/u VV with Dr. Radford with labs at least a day prior:  CBC, CMP, Ferritin   Follow up with [facility]    Infusion scheduling note    Injection scheduling note    Labs    Imaging    Pharmacy appointment    Other referrals                 Plan was discussed with the patient at length, and he verbalized understanding. Evaristo was given an opportunity to ask questions that were answered to his satisfaction, and he was advised to call in the interval if any problems or questions arise.       GEMMA Sullivan, FNP-C  [facility]  20 minutes were spent in coordination of patient's care, record review and counseling.

## 2025-06-13 NOTE — PROGRESS NOTES
[place] - Department    Paul Gonzalez DO      First Office Visit: 6/12/25  Today' Date: 6/12/2025  Last Office Visit:  6/12/25    Chief complaint: L thigh pain     HPI: Pt is a pleasant 79 y.o., who presents for evaluation. Referred by self. Pt seen for new onset L thigh pain and phantom limb pain. Pt has a hx of bilateral BKA and states he has had phantom limb pain for yrs and it has been ongoing. Recently, pt started experiencing L thigh pain that is in the back of the thigh as well as front of the thigh. Pain is constant and worse with lying down and walking. States he has had a vein from L thigh removed yrs ago for a heart operation. Does also endorse constant pain below the knees which has been ongoing since his amputation. States he is currently doing dialysis and his pain has been aggravated during his session of dialysis where he has to sit for prolonged periods of time. Is taking lorazepam and norco currently. No bowel changes. Is doing HEP.           Pain disability index score: 70  Pain score: 10    Relevant Imaging/ Testing: None      Procedures: None    Date of board of pharmacy review:6/12/2025  Date of opioid risk screening/ pain psych: None  Date of opioid agreement and consent: None  Date of urine drug screen: None  Date of random pill count: None     was reviewed today: reviewed, lorazepam and norco use     Prescribed medications: None    See EHR for  PMH, PSH, FH, SH, Medications and Allergy    ROS:  Positive for pain  ROS     PE:  There were no vitals filed for this visit.  General: Pleasant, no distress  HEENT: NC/ AT. PERRLA  CV: Radial pulses intact  Pulm: No distress  Ext: No edema    Physical Exam     Neuromusculoskeletal:  Head: NC, AT  Neck: Intact range of motions  Shoulder: Intact range of motion  Lumbar: Intact range of motion. Bilat Facet loading. Min Tenderness. Neg SL. Pain with flexion and extension    Hip: Intact range of motion  SI: Level, Min tenderness  Knee: Intact  range of motion  Reflexes: normal Knee  Strength: 5/5 globally   Sensory: Grossly intact   Skin: No bruising, erythema  Gait: Normal      Impression:  L thigh pain  Bilateral lower leg pain   Relevant History  BMI 22.11  COPD  CHF  CAD s/p CABG  NSTEMI  ESRD on dialysis  Thrombocytopenia     Assessment:  R/o lumbar radiculopathy - will start the w/u for L-spine etiologies   Phantom limb pain     Assessment & Plan    M54.9 Back pain, unspecified back location, unspecified back pain laterality, unspecified chronicity    BACK PAIN, UNSPECIFIED BACK LOCATION, UNSPECIFIED BACK PAIN LATERALITY, UNSPECIFIED CHRONICITY:  - Discussed potential future interventions including injections, epidurals, and blocks to manage pain.  - Started muscle relaxant.  - Ordered XR.          Plan:  Discussed options  Imaging/ relevant records viewed/ reviewed/ discussed  Imaging results viewed and reviewed (noted above)/ reviewed with patient   reviewed  Cont HEP  XR L-spine  Tizanidine 2 mg TID prn  Schedule LSB for phantom limb pain   Re-eval after  MR L-spine if L thigh pain persists        Prescribed medications:  1. None    The impression and plan were discussed and explained in detail. All the questions were answered. Education was provided accordingly.     The procedure was explained in detail, along with risks and potential side effects.    Follow-up:  For procedure     Beata Brown MD     This note was generated with the assistance of ambient listening technology. Verbal consent was obtained by the patient and accompanying visitor(s) for the recording of patient appointment to facilitate this note. I attest to having reviewed and edited the generated note for accuracy, though some syntax or spelling errors may persist. Please contact the author of this note for any clarification.

## 2025-06-20 ENCOUNTER — TELEPHONE (OUTPATIENT)
Dept: PAIN MEDICINE | Facility: CLINIC | Age: 80
End: 2025-06-20
Payer: MEDICARE

## 2025-06-20 NOTE — TELEPHONE ENCOUNTER
Dr Brown this pt cannot have procedure is ASU  would you like me to offer paola li OR ? Or please advise. Thank you.

## 2025-06-23 NOTE — TELEPHONE ENCOUNTER
Left a message for a return call to cancel or RS to OR RAFAT Benitez      Expiration Date (Optional): 04/30/2023 Detail Level: None Performed By: JEN Lot # (Optional): [id number] Urine Pregnancy Test Result: negative

## 2025-07-10 ENCOUNTER — PATIENT MESSAGE (OUTPATIENT)
Dept: FAMILY MEDICINE | Facility: CLINIC | Age: 80
End: 2025-07-10

## 2025-07-10 ENCOUNTER — OFFICE VISIT (OUTPATIENT)
Dept: FAMILY MEDICINE | Facility: CLINIC | Age: 80
End: 2025-07-10
Payer: MEDICARE

## 2025-07-10 DIAGNOSIS — M79.605 CHRONIC PAIN OF BOTH LOWER EXTREMITIES: Primary | ICD-10-CM

## 2025-07-10 DIAGNOSIS — G89.29 CHRONIC PAIN OF BOTH LOWER EXTREMITIES: Primary | ICD-10-CM

## 2025-07-10 DIAGNOSIS — M79.604 CHRONIC PAIN OF BOTH LOWER EXTREMITIES: Primary | ICD-10-CM

## 2025-07-10 PROCEDURE — 1159F MED LIST DOCD IN RCRD: CPT | Mod: CPTII,95,, | Performed by: INTERNAL MEDICINE

## 2025-07-10 PROCEDURE — 1160F RVW MEDS BY RX/DR IN RCRD: CPT | Mod: CPTII,95,, | Performed by: INTERNAL MEDICINE

## 2025-07-10 PROCEDURE — 98005 SYNCH AUDIO-VIDEO EST LOW 20: CPT | Mod: 95,,, | Performed by: INTERNAL MEDICINE

## 2025-07-10 RX ORDER — TIZANIDINE 4 MG/1
4 TABLET ORAL NIGHTLY PRN
COMMUNITY

## 2025-07-10 RX ORDER — TIZANIDINE HYDROCHLORIDE 4 MG/1
4 CAPSULE, GELATIN COATED ORAL
COMMUNITY
End: 2025-07-10

## 2025-07-10 RX ORDER — HYDROCODONE BITARTRATE AND ACETAMINOPHEN 10; 325 MG/1; MG/1
1 TABLET ORAL EVERY 12 HOURS PRN
Qty: 60 TABLET | Refills: 0 | Status: SHIPPED | OUTPATIENT
Start: 2025-07-10

## 2025-07-10 NOTE — PROGRESS NOTES
Patient ID: Evaristo Cardoso is a 79 y.o. male.    Chief Complaint: No chief complaint on file.    Visit type: AUDIOVISUAL VIRTUAL    Assessment and plan   Chronic pain of both lower extremities  -     HYDROcodone-acetaminophen (NORCO)  mg per tablet; Take 1 tablet by mouth every 12 (twelve) hours as needed (severe pain).  Dispense: 60 tablet; Refill: 0    Continue trazodone   Continue hydrocodone      History of present illness     One-month follow-up.  Unfortunately he was not able to get the pain management procedure due to his high complexity/high-risk state.  On a good note his nephrologist decreased his dialysis days to 2 times a week when his kidney function improved.  They are talking about possibly taking him off dialysis altogether.  Hydrocodone continues to help relieve his chronic pain.  Trazodone did help with sleep.    Review of Systems   Constitutional:  Positive for activity change.   HENT:  Positive for hearing loss. Negative for trouble swallowing.    Eyes:  Negative for discharge.   Respiratory:  Negative for chest tightness and wheezing.    Cardiovascular:  Negative for chest pain and palpitations.   Gastrointestinal:  Negative for blood in stool, constipation, diarrhea and vomiting.   Endocrine: Negative for polydipsia and polyuria.   Genitourinary:  Negative for difficulty urinating, hematuria and urgency.   Musculoskeletal:  Negative for arthralgias and joint swelling.   Neurological:  Positive for weakness. Negative for headaches.   Psychiatric/Behavioral:  Negative for confusion and dysphoric mood.        I personally reviewed past medical, family and social history.        Reference     Wt Readings from Last 3 Encounters:   06/12/25 62.1 kg (137 lb)   05/27/25 62.1 kg (137 lb)   05/20/25 62.3 kg (137 lb 6.4 oz)     Hypertension Medications              carvediloL (COREG) 6.25 MG tablet P.o. q.12 hours except on days of dialysis do half p.o. q.12 hours    nitroGLYCERIN (NITROSTAT)  0.4 MG SL tablet Place 1 tablet (0.4 mg total) under the tongue every 5 (five) minutes as needed for Chest pain.    torsemide (DEMADEX) 20 MG Tab Take 1 tablet (20 mg total) by mouth every morning.              COPD Medications              albuterol (PROVENTIL HFA) 90 mcg/actuation inhaler Inhale 2 puffs into the lungs every 6 (six) hours as needed for Wheezing or Shortness of Breath. Rescue    2/18/25 1039 - Original Entry by Ron Mishra MD  Authorizing Provider: Ron Mishra MD               Name:  albuterol (PROVENTIL HFA) 90 mcg/actuation inhaler Start date:  2/18/25 End date:  --    Medication:  ALBUTEROL SULFATE 90 MCG/ACTUATION [facility] [[id number]]    Dose:  2 puff Route:  Inhalation Frequency:  Every 6 hours PRN    Sig: Inhale 2 puffs into the lungs every 6 (six) hours as needed for Wheezing or Shortness of Breath. Rescue    Instructions:  Rescue             Changes to the order are displayed in red.  Note that there may be changes made to the order that are not shown in this report, such as changes to details about ingredients.     budesonide (PULMICORT) 0.5 mg/2 mL nebulizer solution Take 2 mLs (0.5 mg total) by nebulization once daily. Controller    5/7/25 1353 - Original Entry by Cindy Tierney NP  Authorizing Provider: Cindy Tierney NP               Name:  budesonide (PULMICORT) 0.5 mg/2 mL nebulizer solution Start date:  5/7/25 End date:  8/5/25 5586    Medication:  BUDESONIDE 0.5 MG/2 ML [facility] [[id number]]    Dose:  0.5 mg Route:  Nebulization Frequency:  Daily    Sig: Take 2 mLs (0.5 mg total) by nebulization once daily. Controller    Instructions:  Controller             Changes to the order are displayed in red.  Note that there may be changes made to the order that are not shown in this report, such as changes to details about ingredients.     ipratropium (ATROVENT) 0.02 % nebulizer solution INHALE 1 vial by NEBULIZER TWICE DAILY AS NEEDED FOR WHEEZING    3/26/25 6366 -  Original Entry by Paul Gonzalez DO  Authorizing Provider: Paul Gonzalez DO               Name:  ipratropium (ATROVENT) 0.02 % nebulizer solution Start date:  3/26/25 End date:  --    Medication:  IPRATROPIUM BROMIDE 0.02 % [facility] [[id number]]    Dose:  -- Route:  -- Frequency:  --    Sig: INHALE 1 vial by NEBULIZER TWICE DAILY AS NEEDED FOR WHEEZING    Instructions:  --             Changes to the order are displayed in red.  Note that there may be changes made to the order that are not shown in this report, such as changes to details about ingredients.     levalbuterol (XOPENEX HFA) 45 mcg/actuation inhaler Inhale 1-2 puffs into the lungs every 4 (four) hours as needed for Wheezing. Rescue    2/18/25 1228 - Original Entry by Ron Mishra MD  Authorizing Provider: Ron Mishra MD               Name:  levalbuterol (XOPENEX HFA) 45 mcg/actuation inhaler Start date:  2/18/25 End date:  2/18/26 7940    Medication:  LEVALBUTEROL TARTRATE 45 MCG/ACTUATION [facility] [[id number]]    Dose:  1-2 puff Route:  Inhalation Frequency:  Every 4 hours PRN    Sig: Inhale 1-2 puffs into the lungs every 4 (four) hours as needed for Wheezing. Rescue    Instructions:  Rescue             Changes to the order are displayed in red.  Note that there may be changes made to the order that are not shown in this report, such as changes to details about ingredients.     levalbuterol (XOPENEX) 1.25 mg/3 mL nebulizer solution Take 3 mLs (1.25 mg total) by nebulization every 4 (four) hours as needed for Wheezing.    2/19/25 1617 - Original Entry by Pavan Gandara MD  Authorizing Provider: Pavan Gandara MD               Name:  levalbuterol (XOPENEX) 1.25 mg/3 mL nebulizer solution Start date:  2/19/25 End date:  --    Medication:  LEVALBUTEROL HCL 1.25 MG/3 ML [facility]BU [[id number]]    Dose:  1 ampule Route:  Nebulization Frequency:  Every 4 hours PRN    Sig: Take 3 mLs (1.25 mg total) by nebulization every 4 (four) hours as  needed for Wheezing.    Instructions:  --             Changes to the order are displayed in red.  Note that there may be changes made to the order that are not shown in this report, such as changes to details about ingredients.     tiotropium-olodateroL (STIOLTO RESPIMAT) 2.5-2.5 mcg/actuation Mist Inhale 2 puffs into the lungs Daily. Controller    5/7/25 1345 - Original Entry by Cindy Tierney NP  Authorizing Provider: Cindy Tierney NP               Name:  tiotropium-olodateroL (STIOLTO RESPIMAT) 2.5-2.5 mcg/actuation Mist Start date:  5/7/25 End date:  5/7/26 3004    Medication:  STIOLTO RESPIMAT 2.5-2.5 MCG/ACTUATION [facility] [[id number]]    Dose:  2 puff Route:  Inhalation Frequency:  Daily    Sig: Inhale 2 puffs into the lungs Daily. Controller    Instructions:  Controller             Changes to the order are displayed in red.  Note that there may be changes made to the order that are not shown in this report, such as changes to details about ingredients.                Medication List with Changes/Refills   Current Medications    ALBUTEROL (PROVENTIL HFA) 90 MCG/ACTUATION INHALER    Inhale 2 puffs into the lungs every 6 (six) hours as needed for Wheezing or Shortness of Breath. Rescue    ALPRAZOLAM (XANAX) 0.25 MG TABLET    Take 1 tablet (0.25 mg total) by mouth every evening.    ASPIRIN (ECOTRIN) 81 MG EC TABLET    Take 1 tablet (81 mg total) by mouth every Mon, Wed, Fri.    BUDESONIDE (PULMICORT) 0.5 MG/2 ML NEBULIZER SOLUTION    Take 2 mLs (0.5 mg total) by nebulization once daily. Controller    CARVEDILOL (COREG) 6.25 MG TABLET    P.o. q.12 hours except on days of dialysis do half p.o. q.12 hours    FERROUS GLUCONATE (FERGON) 324 MG TABLET    Take 1 tablet (324 mg total) by mouth daily with breakfast.    FINASTERIDE (PROSCAR) 5 MG TABLET    TAKE ONE TABLET BY MOUTH ONCE DAILY    GABAPENTIN (NEURONTIN) 100 MG CAPSULE    Take 100 mg in the morning, 100 mg in the afternoon and 200 mg at night.     IPRATROPIUM (ATROVENT) 0.02 % NEBULIZER SOLUTION    INHALE 1 vial by NEBULIZER TWICE DAILY AS NEEDED FOR WHEEZING    LACTOBACILLUS RHAMNOSUS GG (CULTURELLE) 10 BILLION CELL CAPSULE    Take 1 capsule by mouth 2 (two) times daily.    LEVALBUTEROL (XOPENEX HFA) 45 MCG/ACTUATION INHALER    Inhale 1-2 puffs into the lungs every 4 (four) hours as needed for Wheezing. Rescue    LEVALBUTEROL (XOPENEX) 1.25 MG/3 ML NEBULIZER SOLUTION    Take 3 mLs (1.25 mg total) by nebulization every 4 (four) hours as needed for Wheezing.    LORAZEPAM (ATIVAN) 0.5 MG TABLET    Take 1 tablet (0.5 mg total) by mouth every 12 (twelve) hours as needed for Anxiety.    MAGNESIUM OXIDE (MAG-OX) 400 MG (241.3 MG MAGNESIUM) TABLET    Take 1 tablet (400 mg total) by mouth once daily.    NICOTINE (NICODERM CQ) 21 MG/24 HR    Place 1 patch onto the skin once daily.    NITROGLYCERIN (NITROSTAT) 0.4 MG SL TABLET    Place 1 tablet (0.4 mg total) under the tongue every 5 (five) minutes as needed for Chest pain.    PANTOPRAZOLE (PROTONIX) 40 MG TABLET    Take 1 tablet (40 mg total) by mouth once daily.    POLYETHYLENE GLYCOL (GLYCOLAX) 17 GRAM PWPK    Take 17 g by mouth 2 (two) times daily.    ALISTAIR-HERMINIA RX 1- MG-MG-MCG TAB    Take 1 tablet by mouth.    SENNA-DOCUSATE 8.6-50 MG (PERICOLACE) 8.6-50 MG PER TABLET    Take 1 tablet by mouth 2 (two) times daily.    SODIUM CHLORIDE 3% 3 % NEBULIZER SOLUTION    TAKE 4 MLS BY NEBULIZATION ONCE DAILY.    TAMSULOSIN (FLOMAX) 0.4 MG CAP    TAKE ONE CAPSULE BY MOUTH once DAILY    TIOTROPIUM-OLODATEROL (STIOLTO RESPIMAT) 2.5-2.5 MCG/ACTUATION MIST    Inhale 2 puffs into the lungs Daily. Controller    TIZANIDINE (ZANAFLEX) 4 MG TABLET    Take 4 mg by mouth nightly as needed.    TORSEMIDE (DEMADEX) 20 MG TAB    Take 1 tablet (20 mg total) by mouth every morning.    TRAZODONE (DESYREL) 50 MG TABLET    Take 1 tablet (50 mg total) by mouth every evening.   Changed and/or Refilled Medications    Modified Medication  Previous Medication    HYDROCODONE-ACETAMINOPHEN (NORCO)  MG PER TABLET HYDROcodone-acetaminophen (NORCO)  mg per tablet       Take 1 tablet by mouth every 12 (twelve) hours as needed (severe pain).    Take 1 tablet by mouth every 12 (twelve) hours as needed (severe pain).   Discontinued Medications    TIZANIDINE 4 MG CAP    Take 4 mg by mouth.          The patient location is:  [place]  The chief complaint leading to consultation is: No primary diagnosis found.   Face to Face time with patient:  25 minutes  minutes of total time spent on the encounter, which includes face to face time and   non-face to face time preparing to see the patient (eg, review of tests), Obtaining and/or   reviewing separately obtained history, Documenting clinical information in the electronic   or other health record, Independently interpreting results (not separately reported) and   communicating results to the patient/family/caregiver, or   Care coordination (not separately reported).     Each patient to whom he or she provides medical services by telemedicine is:    (1) informed of the relationship between the physician and patient and the respective   role of any other health care provider with respect to management of the patient; and   (2) notified that he or she may decline to receive medical services by telemedicine and   may withdraw from such care at any time.    I personally reviewed past medical, family and social history.

## 2025-08-12 ENCOUNTER — PATIENT MESSAGE (OUTPATIENT)
Dept: FAMILY MEDICINE | Facility: CLINIC | Age: 80
End: 2025-08-12
Payer: MEDICARE

## 2025-08-12 DIAGNOSIS — M79.605 CHRONIC PAIN OF BOTH LOWER EXTREMITIES: ICD-10-CM

## 2025-08-12 DIAGNOSIS — M79.604 CHRONIC PAIN OF BOTH LOWER EXTREMITIES: ICD-10-CM

## 2025-08-12 DIAGNOSIS — G89.29 CHRONIC PAIN OF BOTH LOWER EXTREMITIES: ICD-10-CM

## 2025-08-12 RX ORDER — GABAPENTIN 100 MG/1
CAPSULE ORAL
Qty: 120 CAPSULE | Refills: 2 | Status: CANCELLED | OUTPATIENT
Start: 2025-08-12

## 2025-08-12 RX ORDER — PREGABALIN 75 MG/1
75 CAPSULE ORAL 2 TIMES DAILY
Qty: 60 CAPSULE | Refills: 2 | Status: SHIPPED | OUTPATIENT
Start: 2025-08-12 | End: 2026-02-10

## 2025-09-04 DIAGNOSIS — M79.605 CHRONIC PAIN OF BOTH LOWER EXTREMITIES: ICD-10-CM

## 2025-09-04 DIAGNOSIS — M79.604 CHRONIC PAIN OF BOTH LOWER EXTREMITIES: ICD-10-CM

## 2025-09-04 DIAGNOSIS — I50.32 CHRONIC HEART FAILURE WITH PRESERVED EJECTION FRACTION: ICD-10-CM

## 2025-09-04 DIAGNOSIS — G89.29 CHRONIC PAIN OF BOTH LOWER EXTREMITIES: ICD-10-CM

## 2025-09-04 RX ORDER — HYDROCODONE BITARTRATE AND ACETAMINOPHEN 10; 325 MG/1; MG/1
1 TABLET ORAL EVERY 12 HOURS PRN
Qty: 60 TABLET | Refills: 0 | Status: SHIPPED | OUTPATIENT
Start: 2025-09-04

## 2025-09-04 RX ORDER — TORSEMIDE 10 MG/1
TABLET ORAL
Qty: 90 TABLET | Refills: 0 | Status: SHIPPED | OUTPATIENT
Start: 2025-09-04